# Patient Record
Sex: MALE | Race: WHITE | HISPANIC OR LATINO | Employment: FULL TIME | URBAN - METROPOLITAN AREA
[De-identification: names, ages, dates, MRNs, and addresses within clinical notes are randomized per-mention and may not be internally consistent; named-entity substitution may affect disease eponyms.]

---

## 2017-06-04 ENCOUNTER — APPOINTMENT (OUTPATIENT)
Dept: LAB | Facility: HOSPITAL | Age: 52
End: 2017-06-04
Payer: COMMERCIAL

## 2017-06-04 ENCOUNTER — TRANSCRIBE ORDERS (OUTPATIENT)
Dept: LAB | Facility: HOSPITAL | Age: 52
End: 2017-06-04

## 2017-06-04 DIAGNOSIS — Z56.6 OTHER PHYSICAL AND MENTAL STRAIN RELATED TO WORK: ICD-10-CM

## 2017-06-04 DIAGNOSIS — R73.01 IMPAIRED FASTING GLUCOSE: ICD-10-CM

## 2017-06-04 DIAGNOSIS — Z72.820 SLEEP DEPRIVATION: ICD-10-CM

## 2017-06-04 DIAGNOSIS — Z72.0 TOBACCO USE: ICD-10-CM

## 2017-06-04 LAB
ANION GAP SERPL CALCULATED.3IONS-SCNC: 5 MMOL/L (ref 4–13)
BUN SERPL-MCNC: 24 MG/DL (ref 5–25)
CALCIUM SERPL-MCNC: 8.3 MG/DL (ref 8.3–10.1)
CHLORIDE SERPL-SCNC: 106 MMOL/L (ref 100–108)
CO2 SERPL-SCNC: 29 MMOL/L (ref 21–32)
CREAT SERPL-MCNC: 0.71 MG/DL (ref 0.6–1.3)
EST. AVERAGE GLUCOSE BLD GHB EST-MCNC: 117 MG/DL
GFR SERPL CREATININE-BSD FRML MDRD: >60 ML/MIN/1.73SQ M
GLUCOSE P FAST SERPL-MCNC: 117 MG/DL (ref 65–99)
HBA1C MFR BLD: 5.7 % (ref 4.2–6.3)
POTASSIUM SERPL-SCNC: 4.4 MMOL/L (ref 3.5–5.3)
SODIUM SERPL-SCNC: 140 MMOL/L (ref 136–145)

## 2017-06-04 PROCEDURE — 83036 HEMOGLOBIN GLYCOSYLATED A1C: CPT

## 2017-06-04 PROCEDURE — 36415 COLL VENOUS BLD VENIPUNCTURE: CPT

## 2017-06-04 PROCEDURE — 80048 BASIC METABOLIC PNL TOTAL CA: CPT

## 2017-06-04 SDOH — HEALTH STABILITY - MENTAL HEALTH: OTHER PHYSICAL AND MENTAL STRAIN RELATED TO WORK: Z56.6

## 2017-06-05 ENCOUNTER — GENERIC CONVERSION - ENCOUNTER (OUTPATIENT)
Dept: OTHER | Facility: OTHER | Age: 52
End: 2017-06-05

## 2018-01-09 NOTE — MISCELLANEOUS
Message  Return to work or school:   Teresa Oviedo is under my professional care  He was seen in my office on 8/18/16       Please excuse patient but he cannot work overtime from his usual schedule due to his medical conditions, No more than 8hrs  Feel free to contact me if any questions          Future Appointments    Signatures   Electronically signed by : LAMAR Dumas ; Aug 18 2016  4:00PM EST                       (Author)    Electronically signed by : LAMAR Dumas ; Aug 18 2016  5:59PM EST                       (Author)    Electronically signed by : LAMAR Dumas ; Aug 19 2016 10:55AM EST                       (Author)

## 2018-01-11 NOTE — RESULT NOTES
Message   please notify pt his stress test was ok, did not show anything concerning from the heart  Ill follow up with him as scheduled on   tx     Verified Results  ECHO STRESS TEST W CONTRAST IF INDICATED 2016 02:51PM Daniel Parisi     Test Name Result Flag Reference   ECHO STRESS TEST W CONTRAST IF INDICATED (Report)     [address]   [facility], [address] [facility]   [phone]     Exercise Stress Echocardiography     Study date: 13-Sep-2016     Patient: Keiry Fairchild   MR number: [id number]   Account number: [de-identified]   : 1965   Age: 52 years   Gender: Male   Study date: 13-Sep-2016   Status: Outpatient   Location: [address] [place] [address] [facility] lab   Height: 73 in   Weight: 275 lb   BP: 120// 72 mmHg     Indications: Assessment of left ventricular function  Diagnosis: R07 9 - Chest pain, unspecified     Sonographer: KADE Bryson Do   Primary Physician: Meena Coronado MD   Referring Physician: Meena Coronado MD   Group: [facility]   Interpreting Physician: Aretha Guo MD     IMPRESSIONS:   Normal study after maximal exercise without reproduction of symptoms  Left   ventricular systolic function was normal  Diagnostic sensitivity was limited by   submaximal stress  Frequent PVCs were present after exercise  SUMMARY     STRESS RESULTS:   Duration of exercise was 7 min and 26 sec  Maximal work rate was 9 1 METs  Functional capacity was slightly decreased (20% to 30%)  Maximal heart rate during stress was 137 bpm ( 79 % of maximal predicted heart   rate)  Target heart rate was not achieved  There was no chest pain during stress  ECG CONCLUSIONS:   The stress ECG was negative for ischemia  BASELINE:   Estimated left ventricular ejection fraction was 55 %   ECHO CONCLUSIONS:   There was no diagnostic evidence for stress-induced ischemia       HISTORY: The patient is a 52year old male  Chest pain status: chest pain  REST ECG: Normal sinus rhythm  PROCEDURE: The study was performed in the stress lab  The study was performed   in the [address]  Treadmill exercise testing was   performed, using the Adwoa protocol  Stress and rest echocardiographic   evaluation was performed from multiple acoustic windows for evaluation of   ventricular function  ADWOA PROTOCOL:   HR bpm SBP mmHg DBP mmHg Symptoms ST change Rhythm/conduct   Baseline 90 120 72 none none NSR, occasional PVC's   Stage 1 113 122 70 none none sinus tach, occasional PVC's   Stage 2 125 134 68 none none sinus tach, occasional PVC's   Stage 3 137 -- -- fatigue none sinus tach, occasional PVC's   Recovery 1 85 118 72 none none NSR, frequent PVC's     MEDICATIONS GIVEN: No medications or fluids given  STRESS RESULTS: Duration of exercise was 7 min and 26 sec  The patient   exercised to protocol stage 3  Maximal work rate was 9 1 METs  Functional   capacity was slightly decreased (20% to 30%)  Maximal heart rate during stress   was 137 bpm ( 79 % of maximal predicted heart rate)  Target heart rate was not   achieved  The heart rate response to stress was blunted  Maximal systolic blood   pressure during stress was 134 mmHg  There was normal resting blood pressure   with an appropriate response to stress  The rate-pressure product for the peak   heart rate and blood pressure was 66583  There was no chest pain during stress  The stress test was terminated due to arrhythmia  ECG CONCLUSIONS: The stress ECG was negative for ischemia  Arrhythmia during   stress: isolated premature ventricular beats  STRESS 2D ECHO RESULTS:     BASELINE: Left ventricular size was normal  Overall left ventricular systolic   function was normal  Estimated left ventricular ejection fraction was 55 %   PEAK STRESS: There was no evidence for new left ventricular regional wall   motion abnormalities  There was an appropriate reduction in left ventricular   size  There was an appropriate augmentation in LV function  ECHO CONCLUSIONS: There was no diagnostic evidence for stress-induced ischemia  The image quality was good       Prepared and electronically signed by     Morgan Khan MD   Signed [id number] 16:58:59       Signatures   Electronically signed by : Ignacio Nissen, M D ; Sep 14 2016 12:47PM EST                       (Author)

## 2018-01-15 NOTE — MISCELLANEOUS
Message  Return to work or school:   Jeffrey Joshua is under my professional care  He was seen in my office on 11/2/16       Patient has been doing well and stable  He is able to continue regular work duties without restrictions          Future Appointments    Signatures   Electronically signed by : LAMAR Flanagan ; Nov 2 2016  4:11PM EST                       (Author)    Electronically signed by : LAMAR Flanagan ; Nov 2 2016  4:59PM EST                       (Author)    Electronically signed by : LAMAR Flanagan ; Nov 2 2016  5:09PM EST                       (Author)

## 2018-01-15 NOTE — MISCELLANEOUS
Message  Return to work or school:   Char Ghosh is under my professional care  He was seen in my office on 08/18/2016       Please excuse patient he cannot work overtime from his usual schedule due to his medical conditions, no more than 8hrs a day and unlimited          Signatures   Electronically signed by : Yariel Abbott, ; Aug 22 2016  4:56PM EST                       (/Recorder)

## 2018-01-18 NOTE — RESULT NOTES
Verified Results  (1) HEMOGLOBIN A1C [id number] 08:29AM [facility] Order Number: [id number]     Test Name Result Flag Reference   HEMOGLOBIN A1C 5 7 %  4 2-6 3   EST  AVG  GLUCOSE 117 mg/dl       (1) BASIC METABOLIC PROFILE [id number] 08:29AM [facility] Order Number: [id number]     Test Name Result Flag Reference   SODIUM 140 mmol/L  136-145   POTASSIUM 4 4 mmol/L  3 5-5 3   CHLORIDE 106 mmol/L  100-108   CARBON DIOXIDE 29 mmol/L  21-32   ANION GAP (CALC) 5 mmol/L  4-13   BLOOD UREA NITROGEN 24 mg/dL  5-25   CREATININE 0 71 mg/dL  0 60-1 30   Standardized to IDMS reference method   CALCIUM 8 3 mg/dL  8 3-10 1   eGFR Non-African American      >60 0 ml/min/1 73sq [facility] Disease Education Program recommendations are as follows:  GFR calculation is accurate only with a steady state creatinine  Chronic Kidney disease less than 60 ml/min/1 73 sq  meters  Kidney failure less than 15 ml/min/1 73 sq  meters     GLUCOSE FASTING 117 mg/dL H 65-99       Signatures   Electronically signed by : LAMAR Zavala ; Jun 5 2017 12:32PM EST                       (Author)

## 2018-01-24 NOTE — PROGRESS NOTES
Assessment   1  Stress at work (V62 1) (Z56 6)    Plan  Influenza vaccine needed    · Temporarily Stop: Fluzone Quadrivalent 0 5 ML Intramuscular Suspension    Discussion/Summary    Educated pt that he needs to take care of himself and rest is very important  He should sleep at least 6hr or more  A work-life balance is necessary, stress reduction and exercise are also important for a healthy lifestyle  WIll relief him to full work duty but he is aware of recommendations  1    Possible side effects of new medications were reviewed with the patient/guardian today1  The treatment plan was reviewed with the patient/guardian  The patient/guardian understands and agrees with the treatment plan1    The  patient1  was counseled regarding1  instructions for management1 , risk factor reductions1 , impressions1   Total time of encounter was 20 minutes1         1 Amended By: Tru Hoff; Nov 02 2016 5:09 PM EST    Chief Complaint  pt here for a follow up visit  pt wants to have restricted lifted allowing him to work overtime  pt continues to work 2 jobs  pt declined flu vaccine today      History of Present Illness  Pt here for clearance to return to work without restrictions  Pt seen previously after ER visit for chest pain, had work up done, including normal stress test  He states he has been feeling better  At the time he was working 2 jobs ne of them overtime, so restriction were made for him to return but explained he needed to rest as he was only sleeping 2hrs  Now sleeping 6hs  no more CP 1        1 Amended By: Tru Hoff; Nov 02 2016 4:56 PM EST    Active Problems   1  Chest pain, atypical (786 59) (R07 89)  2  Fever of unknown origin (780 60) (R50 9)  3  Impaired fasting glucose (790 21) (R73 01)  4  Lack of adequate sleep (V69 4) (Z72 820)  5  Stress at work (V62 1) (Z56 6)  6  Tobacco use (305 1) (Z72 0)    Past Medical History   1  History of Difficulty breathing (786 09) (R06 89)  2   History of chest pain (V13 89) (Z87 898)  3  Urinary tract infection (599 0) (N39 0)    The active problems and past medical history were reviewed and updated today  1        1 Amended By: Cali Luna; Nov 02 2016 4:59 PM EST    Surgical History   1  History of Prior Surgical Procedure Not Done    Family History  Mother   1  Family history of Alzheimer Disease  2  Family history of Diabetes Mellitus (V18 0)    Social History    · Caffeine Use   · Former smoker (V15 82) (T93 353)   · Lack of adequate sleep (V69 4) (Z72 820)   · Lives with spouse   · Never Drank Alcohol   · Never uses seat belt   · Occupation   · Stress at work (V62 1) (Z56 6)   · Tobacco use (305 1) (Z72 0)    Current Meds  1  No Reported Medications Recorded    The medication list was reviewed and updated today  1        1 Amended By: Cali Luna; Nov 02 2016 4:58 PM EST    Allergies   1  No Known Drug Allergies    Vitals  Vital Signs    Recorded: 12LWP0747 43:09FS   Systolic 970   Diastolic 70   Heart Rate 95   Temperature 97 8 F   O2 Saturation 98   Height 5 ft 11 5 in   Weight 221 lb 2 08 oz   BMI Calculated 30 41   BSA Calculated 2 21     Physical Exam    Constitutional1    General appearance: No acute distress, well appearing and well nourished  1    Pulmonary1    Respiratory effort: No increased work of breathing or signs of respiratory distress  1    Auscultation of lungs: Clear to auscultation, equal breath sounds bilaterally, no wheezes, no rales, no rhonci  1    Cardiovascular1    Auscultation of heart: Normal rate and rhythm, normal S1 and S2, without murmurs  1    Examination of extremities for edema and/or varicosities: Normal 1    Psychiatric1    Mood and affect: Normal 1          1 Amended By: Cali Luna; Nov 02 2016 4:59 PM EST    Message    Gonzales Nye is under my professional care  He was seen in my office on 11/2/16       Patient has been doing well and stable   He is able to continue regular work duties without restrictions  Future Appointments    Date/Time Provider Specialty Site   05/18/2017 04:30 PM Deloris Dandy, M D [address]     Signatures   Electronically signed by : LAMAR Morales ; Nov 2 2016  4:59PM EST                       (Author)    Electronically signed by : LAMAR Morales ; Nov 2 2016  5:09PM EST                       (Author)

## 2018-02-26 ENCOUNTER — OFFICE VISIT (OUTPATIENT)
Dept: FAMILY MEDICINE CLINIC | Facility: CLINIC | Age: 53
End: 2018-02-26
Payer: COMMERCIAL

## 2018-02-26 VITALS
RESPIRATION RATE: 16 BRPM | TEMPERATURE: 98.3 F | WEIGHT: 223.6 LBS | BODY MASS INDEX: 30.28 KG/M2 | DIASTOLIC BLOOD PRESSURE: 80 MMHG | HEART RATE: 78 BPM | HEIGHT: 72 IN | OXYGEN SATURATION: 98 % | SYSTOLIC BLOOD PRESSURE: 110 MMHG

## 2018-02-26 DIAGNOSIS — E66.9 OBESITY (BMI 30-39.9): ICD-10-CM

## 2018-02-26 DIAGNOSIS — Z12.11 ENCOUNTER FOR SCREENING COLONOSCOPY: ICD-10-CM

## 2018-02-26 DIAGNOSIS — R73.01 IMPAIRED FASTING GLUCOSE: Primary | ICD-10-CM

## 2018-02-26 DIAGNOSIS — Z91.89 ENCOUNTER FOR HEPATITIS C VIRUS SCREENING TEST FOR HIGH RISK PATIENT: ICD-10-CM

## 2018-02-26 DIAGNOSIS — Z00.00 HEALTHCARE MAINTENANCE: ICD-10-CM

## 2018-02-26 DIAGNOSIS — Z12.5 SCREENING PSA (PROSTATE SPECIFIC ANTIGEN): ICD-10-CM

## 2018-02-26 DIAGNOSIS — Z11.59 ENCOUNTER FOR HEPATITIS C VIRUS SCREENING TEST FOR HIGH RISK PATIENT: ICD-10-CM

## 2018-02-26 PROCEDURE — 99396 PREV VISIT EST AGE 40-64: CPT | Performed by: FAMILY MEDICINE

## 2018-02-26 NOTE — PROGRESS NOTES
Assessment/Plan:    No problem-specific Assessment & Plan notes found for this encounter  Diagnoses and all orders for this visit:    Impaired fasting glucose  -     Lipid panel; Future  -     Comprehensive metabolic panel; Future  -     CBC and differential; Future  -     Hemoglobin A1c; Future    Healthcare maintenance  -     Ambulatory referral to Gastroenterology; Future  -     Lipid panel; Future  -     Comprehensive metabolic panel; Future  -     CBC and differential; Future  -     Hemoglobin A1c; Future    Obesity (BMI 30-39 9)  -     Ambulatory referral to Gastroenterology; Future  -     Lipid panel; Future  -     Comprehensive metabolic panel; Future  -     CBC and differential; Future  -     Hemoglobin A1c; Future    Encounter for screening colonoscopy  -     Ambulatory referral to Gastroenterology; Future  -     Lipid panel; Future  -     Comprehensive metabolic panel; Future  -     CBC and differential; Future  -     Hemoglobin A1c; Future    Screening PSA (prostate specific antigen)  -     PSA Total (Reflex To Free); Future    Encounter for hepatitis C virus screening test for high risk patient  -     Hepatitis C antibody; Future        Get labs, will call with results    Subjective: Patient is here for a physical exam   Patient has no issues today    Patient refused the flu vac today  Patient ID: Ivis Irene is a 50 y o  male  HPI   Patient presents today for a physical   Patient last seen November 2016    The following portions of the patient's history were reviewed and updated as appropriate: allergies, current medications, past family history, past medical history, past social history, past surgical history and problem list     Review of Systems   Constitutional: Negative for activity change and appetite change  HENT: Negative  Eyes: Negative  Respiratory: Negative  Cardiovascular: Negative  Gastrointestinal: Negative  Genitourinary: Negative  Musculoskeletal: Negative for arthralgias  Skin: Negative for rash  Neurological: Negative  Psychiatric/Behavioral: Negative  Objective:       /80   Pulse 78   Temp 98 3 °F (36 8 °C)   Resp 16   Ht 5' 11 65" (1 82 m)   Wt 101 kg (223 lb 9 6 oz)   SpO2 98%   BMI 30 62 kg/m²          Physical Exam   Constitutional: He is oriented to person, place, and time  He appears well-developed and well-nourished  HENT:   Head: Normocephalic  Eyes: Conjunctivae are normal    Cardiovascular: Normal rate, regular rhythm and normal heart sounds  Pulmonary/Chest: Effort normal and breath sounds normal  No respiratory distress  He has no wheezes  He has no rales  Abdominal: Soft  Bowel sounds are normal  He exhibits no distension  There is no tenderness  Neurological: He is alert and oriented to person, place, and time  Psychiatric: He has a normal mood and affect  His behavior is normal    Vitals reviewed

## 2018-03-04 ENCOUNTER — APPOINTMENT (OUTPATIENT)
Dept: LAB | Facility: HOSPITAL | Age: 53
End: 2018-03-04
Payer: COMMERCIAL

## 2018-03-04 DIAGNOSIS — R73.01 IMPAIRED FASTING GLUCOSE: ICD-10-CM

## 2018-03-04 DIAGNOSIS — Z11.59 ENCOUNTER FOR HEPATITIS C VIRUS SCREENING TEST FOR HIGH RISK PATIENT: ICD-10-CM

## 2018-03-04 DIAGNOSIS — Z91.89 ENCOUNTER FOR HEPATITIS C VIRUS SCREENING TEST FOR HIGH RISK PATIENT: ICD-10-CM

## 2018-03-04 DIAGNOSIS — Z12.11 ENCOUNTER FOR SCREENING COLONOSCOPY: ICD-10-CM

## 2018-03-04 DIAGNOSIS — E66.9 OBESITY (BMI 30-39.9): ICD-10-CM

## 2018-03-04 DIAGNOSIS — Z00.00 HEALTHCARE MAINTENANCE: ICD-10-CM

## 2018-03-04 DIAGNOSIS — Z12.5 SCREENING PSA (PROSTATE SPECIFIC ANTIGEN): ICD-10-CM

## 2018-03-04 LAB
ALBUMIN SERPL BCP-MCNC: 3.9 G/DL (ref 3.5–5)
ALP SERPL-CCNC: 93 U/L (ref 46–116)
ALT SERPL W P-5'-P-CCNC: 42 U/L (ref 12–78)
ANION GAP SERPL CALCULATED.3IONS-SCNC: 4 MMOL/L (ref 4–13)
AST SERPL W P-5'-P-CCNC: 25 U/L (ref 5–45)
BASOPHILS # BLD AUTO: 0 THOUSANDS/ΜL (ref 0–0.1)
BASOPHILS NFR BLD AUTO: 0 % (ref 0–1)
BILIRUB SERPL-MCNC: 0.61 MG/DL (ref 0.2–1)
BUN SERPL-MCNC: 24 MG/DL (ref 5–25)
CALCIUM SERPL-MCNC: 8.2 MG/DL (ref 8.3–10.1)
CHLORIDE SERPL-SCNC: 107 MMOL/L (ref 100–108)
CHOLEST SERPL-MCNC: 147 MG/DL (ref 50–200)
CO2 SERPL-SCNC: 28 MMOL/L (ref 21–32)
CREAT SERPL-MCNC: 0.73 MG/DL (ref 0.6–1.3)
EOSINOPHIL # BLD AUTO: 0.14 THOUSAND/ΜL (ref 0–0.61)
EOSINOPHIL NFR BLD AUTO: 4 % (ref 0–6)
ERYTHROCYTE [DISTWIDTH] IN BLOOD BY AUTOMATED COUNT: 12.6 % (ref 11.6–15.1)
EST. AVERAGE GLUCOSE BLD GHB EST-MCNC: 120 MG/DL
GFR SERPL CREATININE-BSD FRML MDRD: 108 ML/MIN/1.73SQ M
GLUCOSE P FAST SERPL-MCNC: 115 MG/DL (ref 65–99)
HBA1C MFR BLD: 5.8 % (ref 4.2–6.3)
HCT VFR BLD AUTO: 39.2 % (ref 36.5–49.3)
HCV AB SER QL: NORMAL
HDLC SERPL-MCNC: 38 MG/DL (ref 40–60)
HGB BLD-MCNC: 13.5 G/DL (ref 12–17)
LDLC SERPL CALC-MCNC: 88 MG/DL (ref 0–100)
LYMPHOCYTES # BLD AUTO: 1.42 THOUSANDS/ΜL (ref 0.6–4.47)
LYMPHOCYTES NFR BLD AUTO: 45 % (ref 14–44)
MCH RBC QN AUTO: 30.2 PG (ref 26.8–34.3)
MCHC RBC AUTO-ENTMCNC: 34.4 G/DL (ref 31.4–37.4)
MCV RBC AUTO: 88 FL (ref 82–98)
MONOCYTES # BLD AUTO: 0.25 THOUSAND/ΜL (ref 0.17–1.22)
MONOCYTES NFR BLD AUTO: 8 % (ref 4–12)
NEUTROPHILS # BLD AUTO: 1.35 THOUSANDS/ΜL (ref 1.85–7.62)
NEUTS SEG NFR BLD AUTO: 43 % (ref 43–75)
NRBC BLD AUTO-RTO: 0 /100 WBCS
PLATELET # BLD AUTO: 183 THOUSANDS/UL (ref 149–390)
PMV BLD AUTO: 12.3 FL (ref 8.9–12.7)
POTASSIUM SERPL-SCNC: 4.1 MMOL/L (ref 3.5–5.3)
PROT SERPL-MCNC: 7.5 G/DL (ref 6.4–8.2)
RBC # BLD AUTO: 4.47 MILLION/UL (ref 3.88–5.62)
SODIUM SERPL-SCNC: 139 MMOL/L (ref 136–145)
TRIGL SERPL-MCNC: 107 MG/DL
WBC # BLD AUTO: 3.16 THOUSAND/UL (ref 4.31–10.16)

## 2018-03-04 PROCEDURE — 80053 COMPREHEN METABOLIC PANEL: CPT

## 2018-03-04 PROCEDURE — 84153 ASSAY OF PSA TOTAL: CPT

## 2018-03-04 PROCEDURE — 36415 COLL VENOUS BLD VENIPUNCTURE: CPT

## 2018-03-04 PROCEDURE — 86803 HEPATITIS C AB TEST: CPT

## 2018-03-04 PROCEDURE — 80061 LIPID PANEL: CPT

## 2018-03-04 PROCEDURE — 83036 HEMOGLOBIN GLYCOSYLATED A1C: CPT

## 2018-03-04 PROCEDURE — 85025 COMPLETE CBC W/AUTO DIFF WBC: CPT

## 2018-03-07 LAB — MISCELLANEOUS LAB TEST RESULT: NORMAL

## 2019-03-11 NOTE — PROGRESS NOTES
Assessment/Plan:    Prediabetes  Discussed diet changes to work on improving glycemic control, such as eating more vegetables  Also discussed weight loss and increasing exercise  Fasting labwork ordered to assess current status  Obesity (BMI 30-39  9)  Discussed weight loss with the patient  Recommend eating more vegetables and not eating candy at night  Encouraged patient to use the gym at work to exercise regularly  Discussed that patient would need to work up to exercising 300 minutes per week to achieve weight loss  Skin lesion of left ear  Will refer patient to dermatology for evaluation and management of the skin lesion in the ear and the sebaceous cyst on the chest wall  Diagnoses and all orders for this visit:    Prediabetes  -     HEMOGLOBIN A1C W/ EAG ESTIMATION; Future    Screening for colon cancer  -     Occult blood 1-3, stool; Future    Screening, lipid  -     Lipid Panel with Direct LDL reflex; Future    Annual physical exam  -     CBC and differential; Future  -     TSH, 3rd generation with Free T4 reflex; Future  -     Comprehensive metabolic panel; Future    Screening for prostate cancer  -     PSA Total (Reflex To Free); Future    Skin lesion of left ear  -     Ambulatory referral to Dermatology; Future    Obesity (BMI 30 0-34  9)    Sebaceous cyst    Other orders  -     Multiple Vitamin (MULTIVITAMIN) tablet; Take 1 tablet by mouth daily          Subjective: Patient is here for a physical exam   Patient refused the flu vaccine   Patient given the phq 9   Patient is aware he needs a colonoscopy     Health Maintenance Due   Topic Date Due    Depression Screening PHQ  1965    CRC Screening: Colonoscopy  1965    BMI: Followup Plan  09/30/1985    BMI: Adult  09/30/1985          Patient ID: Edie Zhong is a 48 y o  male  Patient presents to clinic today for an annual physical exam   Patient has a history of prediabetes    Upon review of his diet, he will eat lettuce and tomatoes, but no other vegetables  He does like to eat fruits  Patient eats candy before bed every night  He has free access to a gym at work, but he does not use it  He did not have his colonoscopy done as ordered at last year's visit  He has no complaints today  The following portions of the patient's history were reviewed and updated as appropriate: allergies, current medications, past family history, past medical history, past social history, past surgical history and problem list     Review of Systems   Constitutional: Negative  HENT: Negative  Eyes: Negative  Respiratory: Negative  Cardiovascular: Negative  Gastrointestinal: Negative  Genitourinary: Negative  Musculoskeletal: Negative  Skin: Negative  Neurological: Negative  Hematological: Negative  Psychiatric/Behavioral: Negative  Objective:      /70 (BP Location: Left arm, Patient Position: Sitting, Cuff Size: Adult)   Pulse 82   Temp 98 2 °F (36 8 °C) (Oral)   Resp 18   Ht 5' 10 87" (1 8 m)   Wt 104 kg (229 lb)   SpO2 98%   BMI 32 06 kg/m²          Physical Exam   Constitutional: He is oriented to person, place, and time  Vital signs are normal  He appears well-developed and well-nourished  He is cooperative  HENT:   Head: Normocephalic and atraumatic  Right Ear: Hearing, tympanic membrane, external ear and ear canal normal    Left Ear: Hearing, tympanic membrane, external ear and ear canal normal    Nose: Nose normal    Mouth/Throat: Uvula is midline, oropharynx is clear and moist and mucous membranes are normal    Eyes: Pupils are equal, round, and reactive to light  Conjunctivae and lids are normal    Neck: Normal range of motion  Neck supple  Carotid bruit is not present  No thyromegaly present  Cardiovascular: Normal rate, regular rhythm and normal heart sounds  Pulmonary/Chest: Effort normal and breath sounds normal    Abdominal: Soft   Normal appearance and bowel sounds are normal  There is no tenderness  Musculoskeletal: Normal range of motion  Lymphadenopathy:     He has no cervical adenopathy  Neurological: He is alert and oriented to person, place, and time  He has normal strength  No cranial nerve deficit  Reflex Scores:       Patellar reflexes are 2+ on the right side and 2+ on the left side  Skin: Skin is warm and dry  Lesion (left ear--flat and discolored ) noted  Psychiatric: He has a normal mood and affect  His speech is normal and behavior is normal  Thought content normal    Nursing note and vitals reviewed  BMI Counseling: Body mass index is 32 06 kg/m²  Discussed the patient's BMI with him  The BMI is above average  BMI counseling and education was provided to the patient  Nutrition recommendations include 3-5 servings of fruits/vegetables daily and moderation in carbohydrate intake  Exercise recommendations include moderate aerobic physical activity for 150 minutes/week

## 2019-03-12 ENCOUNTER — OFFICE VISIT (OUTPATIENT)
Dept: FAMILY MEDICINE CLINIC | Facility: CLINIC | Age: 54
End: 2019-03-12
Payer: COMMERCIAL

## 2019-03-12 VITALS
SYSTOLIC BLOOD PRESSURE: 120 MMHG | BODY MASS INDEX: 32.06 KG/M2 | RESPIRATION RATE: 18 BRPM | HEART RATE: 82 BPM | DIASTOLIC BLOOD PRESSURE: 70 MMHG | TEMPERATURE: 98.2 F | WEIGHT: 229 LBS | HEIGHT: 71 IN | OXYGEN SATURATION: 98 %

## 2019-03-12 DIAGNOSIS — R73.03 PREDIABETES: Primary | ICD-10-CM

## 2019-03-12 DIAGNOSIS — H61.92 SKIN LESION OF LEFT EAR: ICD-10-CM

## 2019-03-12 DIAGNOSIS — Z12.11 SCREENING FOR COLON CANCER: ICD-10-CM

## 2019-03-12 DIAGNOSIS — L72.3 SEBACEOUS CYST: ICD-10-CM

## 2019-03-12 DIAGNOSIS — E66.9 OBESITY (BMI 30.0-34.9): ICD-10-CM

## 2019-03-12 DIAGNOSIS — Z00.00 ANNUAL PHYSICAL EXAM: ICD-10-CM

## 2019-03-12 DIAGNOSIS — Z13.220 SCREENING, LIPID: ICD-10-CM

## 2019-03-12 DIAGNOSIS — Z12.5 SCREENING FOR PROSTATE CANCER: ICD-10-CM

## 2019-03-12 PROCEDURE — 1036F TOBACCO NON-USER: CPT | Performed by: FAMILY MEDICINE

## 2019-03-12 PROCEDURE — 99214 OFFICE O/P EST MOD 30 MIN: CPT | Performed by: FAMILY MEDICINE

## 2019-03-12 PROCEDURE — 3008F BODY MASS INDEX DOCD: CPT | Performed by: FAMILY MEDICINE

## 2019-03-12 RX ORDER — MULTIVITAMIN
1 TABLET ORAL DAILY
COMMUNITY

## 2019-03-12 NOTE — PATIENT INSTRUCTIONS
Hiperglicemia no diabética   LO QUE NECESITA SABER:   La hiperglicemia es un nivel más elevado que el normal de azúcar (glucosa) en la monique  La hiperglicemia puede presentarse por un periodo corto, o puede volverse nhan condición a ruth ann plazo que conlleva a diabetes  INSTRUCCIONES SOBRE [facility]:   Medicamentos:  Es posible que usted necesite alguno de los siguientes:  · Los medicamentos hipoglicémicos  ayuda a disminuir la cantidad de azúcar en salazar monique  [place] medicamento ayuda a salazar cuerpo a trasportar la glucosa a las células que las necesitan para crear energía  Salazar médico le indicara con qué frecuencia y por cuánto tiempo tiene que samson Vivian  · Insulina  ayuda a disminuir los niveles de azúcar en la monique  Usted podría necesitar 1 o más inyecciones de insulina al día  A usted o a un familiar le enseñarán cómo administrar las inyecciones de insulina  Salazar médico le indicará con qué frecuencia debe inyectarse la insulina cada día  También le indicará por cuánto tiempo necesita tomársela  · [place] andreas medicamentos elsa se le haya indicado  Consulte con salazar médico si usted charles que salazar medicamento no le está ayudando o si presenta efectos secundarios  Infórmele si es alérgico a algún medicamento  Mantenga nhan lista actualizada de los [place], las vitaminas y los productos herbales que john  Incluya los siguientes datos de los medicamentos: cantidad, frecuencia y motivo de administración  Traiga con usted la lista o los envases de la píldoras a andreas citas de seguimiento  Lleve la lista de los medicamentos con usted en erwin de nhan emergencia  Acuda a andreas consultas de control con salazar médico según le indicaron  Es posible que necesite regresar para que le realicen más exámenes  Salazar médico podría también referirlo a un especialista, elsa un dietista  Anote andreas preguntas para que se acuerde de hacerlas yane andreas visitas     Maneje salazar hiperglucemia:  Lo siguiente puede ayudarle a mantener salazar azúcar en monique en los niveles recomendados por salazar médico:  · Ejercítese regularmente  [place] puede ayudar a disminuir salazar nivel de azúcar en la monique  También puede mejorar la tammi de salazar corazón y le va ayudar a tener un peso ideal para usted  Por lo menos tasha 30 minutos de [place] física 5 días cada semana  Pregunte a salazar médico acerca del mejor plan de ejercicio para usted  · Baje de peso si tiene sobrepeso  Incluso nhan mínima disminución del 5% al 10% de salazar peso corporal puede ayudar a reducir el nivel de azúcar en la monique  La perdida de peso también puede mejorar la tammi de salazar corazón  · Consuma alimentos saludables  Incluya alimentos que con elevado contenido de Ann, elsa verduras, frutas, granos integrales y legumbres  También incluya alimentos bajos en grasa, elsa productos lácteos bajos en grasa ([place], yogurt y [place]), pescado y Dilia Douse  Limite los alimentos que son altos en calorías y azúcar, elsa postres, dulces, papitas saladas y caramelos  Limite los alimentos que son altos en sodio, elsa la sal de lara y alimentos salados  Salazar médico le puede recomendar que limite el consumo de carbohidratos para bajar andreas niveles de azúcar en la monique  · No fume  Si usted fuma, nunca es demasiado tarde para dejar de hacerlo  El tabaquismo puede [employer] problemas que pueden ocurrir con la hiperglicemia  Solicite información a salazar médico si usted necesita ayuda para dejar de fumar  · Limite el consumo de alcohol  Las mujeres deberían limitar el consumo de alcohol a 1 bebida por día  Los hombres deberían limitar el consumo de alcohol a 2 tragos al día  Un trago equivale a 12 onzas de cerveza, 5 onzas de vino o 1 onza y ½ de licor  [address] revisar salazar nivel de azúcar en la monique:  Es posible que necesite revisar salazar nivel de azúcar o glucosa en la monique con un glucómetro   Si usted john insulina, usted puede que necesite revisar salazar nivel de azúcar en la monique por lo menos 3 veces al día  Pregunte a hernandez médico cuándo y con qué frecuencia revisarlo yane el día  Pregunte cuál debería ser hernandez nivel de azúcar antes y después de comer  Es posible que deba controlar si tiene presencia de cetonas en hernandez orina o hernandez monique en erwin de que hernandez nivel de azúcar en la monique sea alto  Anote andreas resultados y muéstreselos a hernandez Tiffanie Pea a hernandez [place] vitaminas y minerales son adecuados para usted  · Usted tiene fiebre  · Hernandez nivel de azúcar en la monique continua estando más alto que lo que le indicaron que debería estar  · Usted sigue orinando con más frecuencia que lo acostumbrado  · Usted continua teniendo más sed que lo usual      · Usted continua teniendo náuseas y vómitos  · Usted tiene nhan herida que tiene signos de infección, elsa enrojecimiento, inflamación y pus  · Usted tiene preguntas o inquietudes acerca de hernandez condición o cuidado  Regrese a la juana de emergencias si:   · Hernandez brazo o pierna se siente caliente, sensible y adolorida  Se podría jose inflamado y askew  · Usted se siente mareada, le hace falta el aire y tiene dolor de [place]  · Usted expectora monique  © 2017 [address] [employer] Information is for End User's use only and may not be sold, redistributed or otherwise used for commercial purposes  All illustrations and images included in CareNotes® are the copyrighted property of A D A M , Inc  or Carlos Escudero  Esta información es sólo para uso en educación  Hernandez intención no es darle un consejo médico sobre enfermedades o tratamientos  Colsulte con hernandez Theresa Soler farmacéutico antes de seguir cualquier régimen médico para saber si es seguro y efectivo para usted

## 2019-03-12 NOTE — ASSESSMENT & PLAN NOTE
Discussed diet changes to work on improving glycemic control, such as eating more vegetables  Also discussed weight loss and increasing exercise  Fasting labwork ordered to assess current status

## 2019-03-12 NOTE — ASSESSMENT & PLAN NOTE
Discussed weight loss with the patient  Recommend eating more vegetables and not eating candy at night  Encouraged patient to use the gym at work to exercise regularly  Discussed that patient would need to work up to exercising 300 minutes per week to achieve weight loss

## 2019-03-12 NOTE — ASSESSMENT & PLAN NOTE
Will refer patient to dermatology for evaluation and management of the skin lesion in the ear and the sebaceous cyst on the chest wall

## 2019-03-23 ENCOUNTER — APPOINTMENT (OUTPATIENT)
Dept: LAB | Facility: HOSPITAL | Age: 54
End: 2019-03-23
Payer: COMMERCIAL

## 2019-03-23 ENCOUNTER — TRANSCRIBE ORDERS (OUTPATIENT)
Dept: ADMINISTRATIVE | Facility: HOSPITAL | Age: 54
End: 2019-03-23

## 2019-03-23 DIAGNOSIS — R73.03 PREDIABETES: ICD-10-CM

## 2019-03-23 DIAGNOSIS — Z12.5 SCREENING FOR PROSTATE CANCER: ICD-10-CM

## 2019-03-23 DIAGNOSIS — Z13.220 SCREENING, LIPID: ICD-10-CM

## 2019-03-23 DIAGNOSIS — Z00.00 ANNUAL PHYSICAL EXAM: ICD-10-CM

## 2019-03-23 LAB
ALBUMIN SERPL BCP-MCNC: 3.9 G/DL (ref 3.5–5)
ALP SERPL-CCNC: 103 U/L (ref 46–116)
ALT SERPL W P-5'-P-CCNC: 83 U/L (ref 12–78)
ANION GAP SERPL CALCULATED.3IONS-SCNC: 7 MMOL/L (ref 4–13)
AST SERPL W P-5'-P-CCNC: 37 U/L (ref 5–45)
BASOPHILS # BLD AUTO: 0.02 THOUSANDS/ΜL (ref 0–0.1)
BASOPHILS NFR BLD AUTO: 1 % (ref 0–1)
BILIRUB SERPL-MCNC: 0.6 MG/DL (ref 0.2–1)
BUN SERPL-MCNC: 22 MG/DL (ref 5–25)
CALCIUM SERPL-MCNC: 8.7 MG/DL (ref 8.3–10.1)
CHLORIDE SERPL-SCNC: 105 MMOL/L (ref 100–108)
CHOLEST SERPL-MCNC: 147 MG/DL (ref 50–200)
CO2 SERPL-SCNC: 27 MMOL/L (ref 21–32)
CREAT SERPL-MCNC: 0.81 MG/DL (ref 0.6–1.3)
EOSINOPHIL # BLD AUTO: 0.17 THOUSAND/ΜL (ref 0–0.61)
EOSINOPHIL NFR BLD AUTO: 4 % (ref 0–6)
ERYTHROCYTE [DISTWIDTH] IN BLOOD BY AUTOMATED COUNT: 12 % (ref 11.6–15.1)
EST. AVERAGE GLUCOSE BLD GHB EST-MCNC: 140 MG/DL
GFR SERPL CREATININE-BSD FRML MDRD: 103 ML/MIN/1.73SQ M
GLUCOSE P FAST SERPL-MCNC: 147 MG/DL (ref 65–99)
HBA1C MFR BLD: 6.5 % (ref 4.2–6.3)
HCT VFR BLD AUTO: 42.7 % (ref 36.5–49.3)
HDLC SERPL-MCNC: 30 MG/DL (ref 40–60)
HGB BLD-MCNC: 14.4 G/DL (ref 12–17)
IMM GRANULOCYTES # BLD AUTO: 0.01 THOUSAND/UL (ref 0–0.2)
IMM GRANULOCYTES NFR BLD AUTO: 0 % (ref 0–2)
LDLC SERPL CALC-MCNC: 80 MG/DL (ref 0–100)
LYMPHOCYTES # BLD AUTO: 1.6 THOUSANDS/ΜL (ref 0.6–4.47)
LYMPHOCYTES NFR BLD AUTO: 41 % (ref 14–44)
MCH RBC QN AUTO: 30.9 PG (ref 26.8–34.3)
MCHC RBC AUTO-ENTMCNC: 33.7 G/DL (ref 31.4–37.4)
MCV RBC AUTO: 92 FL (ref 82–98)
MONOCYTES # BLD AUTO: 0.36 THOUSAND/ΜL (ref 0.17–1.22)
MONOCYTES NFR BLD AUTO: 9 % (ref 4–12)
NEUTROPHILS # BLD AUTO: 1.74 THOUSANDS/ΜL (ref 1.85–7.62)
NEUTS SEG NFR BLD AUTO: 45 % (ref 43–75)
NRBC BLD AUTO-RTO: 0 /100 WBCS
PLATELET # BLD AUTO: 212 THOUSANDS/UL (ref 149–390)
PMV BLD AUTO: 12 FL (ref 8.9–12.7)
POTASSIUM SERPL-SCNC: 4.5 MMOL/L (ref 3.5–5.3)
PROT SERPL-MCNC: 7.4 G/DL (ref 6.4–8.2)
PSA SERPL-MCNC: 0.3 NG/ML (ref 0–4)
RBC # BLD AUTO: 4.66 MILLION/UL (ref 3.88–5.62)
SODIUM SERPL-SCNC: 139 MMOL/L (ref 136–145)
TRIGL SERPL-MCNC: 185 MG/DL
TSH SERPL DL<=0.05 MIU/L-ACNC: 1.34 UIU/ML (ref 0.36–3.74)
WBC # BLD AUTO: 3.9 THOUSAND/UL (ref 4.31–10.16)

## 2019-03-23 PROCEDURE — 80061 LIPID PANEL: CPT

## 2019-03-23 PROCEDURE — 36415 COLL VENOUS BLD VENIPUNCTURE: CPT

## 2019-03-23 PROCEDURE — 85025 COMPLETE CBC W/AUTO DIFF WBC: CPT

## 2019-03-23 PROCEDURE — G0103 PSA SCREENING: HCPCS

## 2019-03-23 PROCEDURE — 84443 ASSAY THYROID STIM HORMONE: CPT

## 2019-03-23 PROCEDURE — 83036 HEMOGLOBIN GLYCOSYLATED A1C: CPT

## 2019-03-23 PROCEDURE — 80053 COMPREHEN METABOLIC PANEL: CPT

## 2019-03-28 ENCOUNTER — TELEPHONE (OUTPATIENT)
Dept: FAMILY MEDICINE CLINIC | Facility: CLINIC | Age: 54
End: 2019-03-28

## 2019-03-29 ENCOUNTER — TELEPHONE (OUTPATIENT)
Dept: FAMILY MEDICINE CLINIC | Facility: CLINIC | Age: 54
End: 2019-03-29

## 2019-04-08 ENCOUNTER — OFFICE VISIT (OUTPATIENT)
Dept: FAMILY MEDICINE CLINIC | Facility: CLINIC | Age: 54
End: 2019-04-08
Payer: COMMERCIAL

## 2019-04-08 VITALS
TEMPERATURE: 99.2 F | WEIGHT: 226 LBS | BODY MASS INDEX: 31.64 KG/M2 | OXYGEN SATURATION: 97 % | RESPIRATION RATE: 18 BRPM | HEIGHT: 71 IN | HEART RATE: 42 BPM | SYSTOLIC BLOOD PRESSURE: 120 MMHG | DIASTOLIC BLOOD PRESSURE: 80 MMHG

## 2019-04-08 DIAGNOSIS — E78.5 DYSLIPIDEMIA: ICD-10-CM

## 2019-04-08 DIAGNOSIS — E11.9 TYPE 2 DIABETES MELLITUS WITHOUT COMPLICATION, WITHOUT LONG-TERM CURRENT USE OF INSULIN (HCC): Primary | ICD-10-CM

## 2019-04-08 DIAGNOSIS — D72.819 LEUKOPENIA, UNSPECIFIED TYPE: ICD-10-CM

## 2019-04-08 PROCEDURE — 1036F TOBACCO NON-USER: CPT | Performed by: FAMILY MEDICINE

## 2019-04-08 PROCEDURE — 3008F BODY MASS INDEX DOCD: CPT | Performed by: FAMILY MEDICINE

## 2019-04-08 PROCEDURE — 99214 OFFICE O/P EST MOD 30 MIN: CPT | Performed by: FAMILY MEDICINE

## 2019-04-14 ENCOUNTER — TRANSCRIBE ORDERS (OUTPATIENT)
Dept: LAB | Facility: HOSPITAL | Age: 54
End: 2019-04-14

## 2019-04-14 ENCOUNTER — TELEPHONE (OUTPATIENT)
Dept: OTHER | Facility: OTHER | Age: 54
End: 2019-04-14

## 2019-04-15 DIAGNOSIS — Z12.11 SCREENING FOR COLON CANCER: Primary | ICD-10-CM

## 2019-04-15 DIAGNOSIS — R73.03 PREDIABETES: ICD-10-CM

## 2019-08-05 ENCOUNTER — TELEPHONE (OUTPATIENT)
Dept: FAMILY MEDICINE CLINIC | Facility: CLINIC | Age: 54
End: 2019-08-05

## 2019-10-13 ENCOUNTER — APPOINTMENT (OUTPATIENT)
Dept: LAB | Facility: HOSPITAL | Age: 54
End: 2019-10-13
Payer: COMMERCIAL

## 2019-10-13 DIAGNOSIS — D72.819 LEUKOPENIA, UNSPECIFIED TYPE: ICD-10-CM

## 2019-10-13 DIAGNOSIS — E78.5 DYSLIPIDEMIA: ICD-10-CM

## 2019-10-13 DIAGNOSIS — Z12.11 SCREENING FOR COLON CANCER: ICD-10-CM

## 2019-10-13 DIAGNOSIS — E11.9 TYPE 2 DIABETES MELLITUS WITHOUT COMPLICATION, WITHOUT LONG-TERM CURRENT USE OF INSULIN (HCC): ICD-10-CM

## 2019-10-13 LAB
ALBUMIN SERPL BCP-MCNC: 4.2 G/DL (ref 3.5–5)
ALP SERPL-CCNC: 86 U/L (ref 46–116)
ALT SERPL W P-5'-P-CCNC: 52 U/L (ref 12–78)
ANION GAP SERPL CALCULATED.3IONS-SCNC: 4 MMOL/L (ref 4–13)
AST SERPL W P-5'-P-CCNC: 23 U/L (ref 5–45)
BASOPHILS # BLD AUTO: 0.01 THOUSANDS/ΜL (ref 0–0.1)
BASOPHILS NFR BLD AUTO: 0 % (ref 0–1)
BILIRUB SERPL-MCNC: 0.84 MG/DL (ref 0.2–1)
BUN SERPL-MCNC: 20 MG/DL (ref 5–25)
CALCIUM SERPL-MCNC: 9 MG/DL (ref 8.3–10.1)
CHLORIDE SERPL-SCNC: 108 MMOL/L (ref 100–108)
CHOLEST SERPL-MCNC: 153 MG/DL (ref 50–200)
CO2 SERPL-SCNC: 26 MMOL/L (ref 21–32)
CREAT SERPL-MCNC: 0.84 MG/DL (ref 0.6–1.3)
CREAT UR-MCNC: 329 MG/DL
EOSINOPHIL # BLD AUTO: 0.12 THOUSAND/ΜL (ref 0–0.61)
EOSINOPHIL NFR BLD AUTO: 4 % (ref 0–6)
ERYTHROCYTE [DISTWIDTH] IN BLOOD BY AUTOMATED COUNT: 12.1 % (ref 11.6–15.1)
EST. AVERAGE GLUCOSE BLD GHB EST-MCNC: 120 MG/DL
GFR SERPL CREATININE-BSD FRML MDRD: 101 ML/MIN/1.73SQ M
GLUCOSE P FAST SERPL-MCNC: 130 MG/DL (ref 65–99)
HBA1C MFR BLD: 5.8 % (ref 4.2–6.3)
HCT VFR BLD AUTO: 42.3 % (ref 36.5–49.3)
HDLC SERPL-MCNC: 38 MG/DL (ref 40–60)
HEMOCCULT STL QL IA: NEGATIVE
HGB BLD-MCNC: 14.4 G/DL (ref 12–17)
IMM GRANULOCYTES # BLD AUTO: 0.01 THOUSAND/UL (ref 0–0.2)
IMM GRANULOCYTES NFR BLD AUTO: 0 % (ref 0–2)
LDLC SERPL CALC-MCNC: 84 MG/DL (ref 0–100)
LYMPHOCYTES # BLD AUTO: 1.47 THOUSANDS/ΜL (ref 0.6–4.47)
LYMPHOCYTES NFR BLD AUTO: 48 % (ref 14–44)
MCH RBC QN AUTO: 30.6 PG (ref 26.8–34.3)
MCHC RBC AUTO-ENTMCNC: 34 G/DL (ref 31.4–37.4)
MCV RBC AUTO: 90 FL (ref 82–98)
MICROALBUMIN UR-MCNC: 27 MG/L (ref 0–20)
MICROALBUMIN/CREAT 24H UR: 8 MG/G CREATININE (ref 0–30)
MONOCYTES # BLD AUTO: 0.28 THOUSAND/ΜL (ref 0.17–1.22)
MONOCYTES NFR BLD AUTO: 9 % (ref 4–12)
NEUTROPHILS # BLD AUTO: 1.22 THOUSANDS/ΜL (ref 1.85–7.62)
NEUTS SEG NFR BLD AUTO: 39 % (ref 43–75)
NRBC BLD AUTO-RTO: 0 /100 WBCS
PLATELET # BLD AUTO: 232 THOUSANDS/UL (ref 149–390)
PMV BLD AUTO: 11.7 FL (ref 8.9–12.7)
POTASSIUM SERPL-SCNC: 4.2 MMOL/L (ref 3.5–5.3)
PROT SERPL-MCNC: 7.9 G/DL (ref 6.4–8.2)
RBC # BLD AUTO: 4.7 MILLION/UL (ref 3.88–5.62)
SODIUM SERPL-SCNC: 138 MMOL/L (ref 136–145)
TRIGL SERPL-MCNC: 157 MG/DL
WBC # BLD AUTO: 3.11 THOUSAND/UL (ref 4.31–10.16)

## 2019-10-13 PROCEDURE — 80061 LIPID PANEL: CPT

## 2019-10-13 PROCEDURE — 85025 COMPLETE CBC W/AUTO DIFF WBC: CPT

## 2019-10-13 PROCEDURE — 83036 HEMOGLOBIN GLYCOSYLATED A1C: CPT

## 2019-10-13 PROCEDURE — 82043 UR ALBUMIN QUANTITATIVE: CPT | Performed by: FAMILY MEDICINE

## 2019-10-13 PROCEDURE — 36415 COLL VENOUS BLD VENIPUNCTURE: CPT

## 2019-10-13 PROCEDURE — G0328 FECAL BLOOD SCRN IMMUNOASSAY: HCPCS

## 2019-10-13 PROCEDURE — 82570 ASSAY OF URINE CREATININE: CPT | Performed by: FAMILY MEDICINE

## 2019-10-13 PROCEDURE — 80053 COMPREHEN METABOLIC PANEL: CPT

## 2019-10-16 ENCOUNTER — OFFICE VISIT (OUTPATIENT)
Dept: FAMILY MEDICINE CLINIC | Facility: CLINIC | Age: 54
End: 2019-10-16
Payer: COMMERCIAL

## 2019-10-16 VITALS
RESPIRATION RATE: 17 BRPM | OXYGEN SATURATION: 98 % | WEIGHT: 217 LBS | SYSTOLIC BLOOD PRESSURE: 118 MMHG | HEART RATE: 72 BPM | DIASTOLIC BLOOD PRESSURE: 76 MMHG | BODY MASS INDEX: 31.07 KG/M2 | HEIGHT: 70 IN | TEMPERATURE: 98.3 F

## 2019-10-16 DIAGNOSIS — R22.2 MASS OF LEFT CHEST WALL: ICD-10-CM

## 2019-10-16 DIAGNOSIS — E78.5 HYPERLIPIDEMIA LDL GOAL <70: ICD-10-CM

## 2019-10-16 DIAGNOSIS — E11.9 TYPE 2 DIABETES MELLITUS WITHOUT COMPLICATION, WITHOUT LONG-TERM CURRENT USE OF INSULIN (HCC): Primary | ICD-10-CM

## 2019-10-16 DIAGNOSIS — Z28.21 REFUSED INFLUENZA VACCINE: ICD-10-CM

## 2019-10-16 DIAGNOSIS — Z12.5 PROSTATE CANCER SCREENING: ICD-10-CM

## 2019-10-16 DIAGNOSIS — Z23 NEED FOR VACCINATION AGAINST STREPTOCOCCUS PNEUMONIAE: ICD-10-CM

## 2019-10-16 PROCEDURE — 3008F BODY MASS INDEX DOCD: CPT | Performed by: FAMILY MEDICINE

## 2019-10-16 PROCEDURE — 99214 OFFICE O/P EST MOD 30 MIN: CPT | Performed by: FAMILY MEDICINE

## 2019-10-16 PROCEDURE — 90471 IMMUNIZATION ADMIN: CPT

## 2019-10-16 PROCEDURE — 90732 PPSV23 VACC 2 YRS+ SUBQ/IM: CPT

## 2019-10-16 RX ORDER — ROSUVASTATIN CALCIUM 5 MG/1
5 TABLET, COATED ORAL DAILY
Qty: 90 TABLET | Refills: 1 | Status: SHIPPED | OUTPATIENT
Start: 2019-10-16 | End: 2020-03-24 | Stop reason: SDUPTHER

## 2019-10-16 NOTE — ASSESSMENT & PLAN NOTE
Lab Results   Component Value Date    HGBA1C 5 8 10/13/2019     Patient is doing very well with diet control  Will continue current management    Patient encouraged to obtain his diabetic eye exam

## 2019-10-16 NOTE — ASSESSMENT & PLAN NOTE
Patient has a slightly elevated triglyceride and low HDL level  Recommend taking fish oil OTC  Discussed that patient's LDL is elevated at 84, and due to diabetes his LDL goal is < 70  Will start treatment with rosuvastatin 5 mg daily  Plan to reassess with labs and follow-up in 6 months

## 2019-10-16 NOTE — PATIENT INSTRUCTIONS
Perfil lipídico   CUIDADO AMBULATORIO:   Un perfil lipídico  o panel lipídico, es un examen de monique para revisar andreas niveles de lípidos  Los lípidos son grasas que no pueden disolverse en la monique  Los [facility] de lípidos aumentan salazar riesgo de enfermedad cardíaca y de un ataque al corazón o de un derrame cerebral  Un perfil lipídico incluye lo siguiente:  · El colesterol total  es el número principal utilizado para los valores de [place]  ¨ Óptimo: Menos de 200 mg/dL    ¨ Límite alto: de 200 a 239 mg/dL    ¨ Alto: 240 mg/dL o mayor    · El colesterol LDL (kadi)  transporta el colesterol y lo deposita en las arterias  [place] puede provocar nhan obstrucción  ¨ Óptimo: 100 mg/dL o más bajo  (70 mg/dL for diabetes)    ¨ Cercano al nivel óptimo: de 100 a 129 mg/dL     ¨ Límite alto: de 130 a 159 mg/dL    ¨ Alto: de 160 a 189 mg/dL    ¨ Muy alto: 190 mg/dL o mayor    · El colesterol HDL (lopez)  remueve el colesterol de salazar cuerpo  ¨ Óptimo: 60 mg/dL o mayor    ¨ Límite de riesgo: de 40 a 59 mg/dL    ¨ Riesgo alto: 40 mg/dL o más bajo    · Los triglicéridos  son  Bold clase diferente de grasa que el colesterol  ¨ Óptimo: 150 mg/dL o más bajo    ¨ Límite alto: de 150 a 199 mg/dL    ¨ Alto: de 200 a 499 mg/dL    ¨ Muy alto: 500 mg/dL o mayor  Cómo prepararse para el examen:  No tome ni coma nada, excepto agua, por 12 a 14 horas antes del examen  Pregunte a salazar médico si usted debería samson andreas medicamentos el día de salazar examen  Lo que necesita saber [facility] examen:  Salazar médico va a charlar con [employer]  Si los resultados son anormales, usted podría necesitar tratamiento para reducir salazar riesgo de enfermedad cardíaca  © 2017 [address] [employer]ason Information is for End User's use only and may not be sold, redistributed or otherwise used for commercial purposes   All illustrations and images included in CareNotes® are the copyrighted property of A D A M , Inc  or Carlos Kena  Esta información es sólo para uso en educación  Salazar intención no es darle un consejo médico sobre enfermedades o tratamientos  Colsulte con salazar Ronalee Mon farmacéutico antes de seguir cualquier régimen médico para saber si es seguro y efectivo para usted

## 2020-03-24 DIAGNOSIS — E78.5 HYPERLIPIDEMIA LDL GOAL <70: ICD-10-CM

## 2020-03-24 RX ORDER — ROSUVASTATIN CALCIUM 5 MG/1
5 TABLET, COATED ORAL DAILY
Qty: 90 TABLET | Refills: 0 | Status: SHIPPED | OUTPATIENT
Start: 2020-03-24 | End: 2020-05-11

## 2020-04-15 ENCOUNTER — TELEMEDICINE (OUTPATIENT)
Dept: FAMILY MEDICINE CLINIC | Facility: CLINIC | Age: 55
End: 2020-04-15
Payer: COMMERCIAL

## 2020-04-15 DIAGNOSIS — U07.1 COVID-19 VIRUS INFECTION: Primary | ICD-10-CM

## 2020-04-15 PROCEDURE — 99213 OFFICE O/P EST LOW 20 MIN: CPT | Performed by: FAMILY MEDICINE

## 2020-05-09 DIAGNOSIS — E78.5 HYPERLIPIDEMIA LDL GOAL <70: ICD-10-CM

## 2020-05-11 RX ORDER — ROSUVASTATIN CALCIUM 5 MG/1
5 TABLET, COATED ORAL DAILY
Qty: 90 TABLET | Refills: 0 | Status: SHIPPED | OUTPATIENT
Start: 2020-05-11 | End: 2020-07-08 | Stop reason: SDUPTHER

## 2020-05-21 ENCOUNTER — TELEPHONE (OUTPATIENT)
Dept: FAMILY MEDICINE CLINIC | Facility: CLINIC | Age: 55
End: 2020-05-21

## 2020-05-29 ENCOUNTER — TELEPHONE (OUTPATIENT)
Dept: FAMILY MEDICINE CLINIC | Facility: CLINIC | Age: 55
End: 2020-05-29

## 2020-07-03 ENCOUNTER — APPOINTMENT (OUTPATIENT)
Dept: LAB | Facility: HOSPITAL | Age: 55
End: 2020-07-03
Payer: COMMERCIAL

## 2020-07-03 DIAGNOSIS — Z12.5 PROSTATE CANCER SCREENING: ICD-10-CM

## 2020-07-03 DIAGNOSIS — E78.5 HYPERLIPIDEMIA LDL GOAL <70: ICD-10-CM

## 2020-07-03 DIAGNOSIS — E11.9 TYPE 2 DIABETES MELLITUS WITHOUT COMPLICATION, WITHOUT LONG-TERM CURRENT USE OF INSULIN (HCC): ICD-10-CM

## 2020-07-03 LAB
ALBUMIN SERPL BCP-MCNC: 3.9 G/DL (ref 3.5–5)
ALP SERPL-CCNC: 80 U/L (ref 46–116)
ALT SERPL W P-5'-P-CCNC: 41 U/L (ref 12–78)
ANION GAP SERPL CALCULATED.3IONS-SCNC: 5 MMOL/L (ref 4–13)
AST SERPL W P-5'-P-CCNC: 24 U/L (ref 5–45)
BILIRUB SERPL-MCNC: 0.71 MG/DL (ref 0.2–1)
BUN SERPL-MCNC: 27 MG/DL (ref 5–25)
CALCIUM SERPL-MCNC: 8.7 MG/DL (ref 8.3–10.1)
CHLORIDE SERPL-SCNC: 110 MMOL/L (ref 100–108)
CHOLEST SERPL-MCNC: 108 MG/DL (ref 50–200)
CO2 SERPL-SCNC: 25 MMOL/L (ref 21–32)
CREAT SERPL-MCNC: 0.7 MG/DL (ref 0.6–1.3)
EST. AVERAGE GLUCOSE BLD GHB EST-MCNC: 120 MG/DL
GFR SERPL CREATININE-BSD FRML MDRD: 109 ML/MIN/1.73SQ M
GLUCOSE P FAST SERPL-MCNC: 114 MG/DL (ref 65–99)
HBA1C MFR BLD: 5.8 %
HDLC SERPL-MCNC: 35 MG/DL
LDLC SERPL CALC-MCNC: 58 MG/DL (ref 0–100)
POTASSIUM SERPL-SCNC: 4.4 MMOL/L (ref 3.5–5.3)
PROT SERPL-MCNC: 7.4 G/DL (ref 6.4–8.2)
SODIUM SERPL-SCNC: 140 MMOL/L (ref 136–145)
TRIGL SERPL-MCNC: 75 MG/DL

## 2020-07-03 PROCEDURE — 84153 ASSAY OF PSA TOTAL: CPT

## 2020-07-03 PROCEDURE — 36415 COLL VENOUS BLD VENIPUNCTURE: CPT

## 2020-07-03 PROCEDURE — 80053 COMPREHEN METABOLIC PANEL: CPT

## 2020-07-03 PROCEDURE — 80061 LIPID PANEL: CPT

## 2020-07-03 PROCEDURE — 83036 HEMOGLOBIN GLYCOSYLATED A1C: CPT

## 2020-07-03 PROCEDURE — 3044F HG A1C LEVEL LT 7.0%: CPT | Performed by: FAMILY MEDICINE

## 2020-07-03 PROCEDURE — 84154 ASSAY OF PSA FREE: CPT

## 2020-07-08 ENCOUNTER — OFFICE VISIT (OUTPATIENT)
Dept: FAMILY MEDICINE CLINIC | Facility: CLINIC | Age: 55
End: 2020-07-08
Payer: COMMERCIAL

## 2020-07-08 VITALS
SYSTOLIC BLOOD PRESSURE: 110 MMHG | HEART RATE: 79 BPM | OXYGEN SATURATION: 98 % | DIASTOLIC BLOOD PRESSURE: 62 MMHG | BODY MASS INDEX: 30.21 KG/M2 | HEIGHT: 70 IN | RESPIRATION RATE: 17 BRPM | WEIGHT: 211 LBS | TEMPERATURE: 96.7 F

## 2020-07-08 DIAGNOSIS — E78.5 HYPERLIPIDEMIA LDL GOAL <70: ICD-10-CM

## 2020-07-08 DIAGNOSIS — D72.819 LEUKOPENIA, UNSPECIFIED TYPE: ICD-10-CM

## 2020-07-08 DIAGNOSIS — E11.9 TYPE 2 DIABETES MELLITUS WITHOUT COMPLICATION, WITHOUT LONG-TERM CURRENT USE OF INSULIN (HCC): Primary | ICD-10-CM

## 2020-07-08 LAB
PSA FREE MFR SERPL: 15 %
PSA FREE SERPL-MCNC: 0.06 NG/ML
PSA SERPL-MCNC: 0.4 NG/ML (ref 0–4)

## 2020-07-08 PROCEDURE — 99214 OFFICE O/P EST MOD 30 MIN: CPT | Performed by: FAMILY MEDICINE

## 2020-07-08 PROCEDURE — 1036F TOBACCO NON-USER: CPT | Performed by: FAMILY MEDICINE

## 2020-07-08 PROCEDURE — 3008F BODY MASS INDEX DOCD: CPT | Performed by: FAMILY MEDICINE

## 2020-07-08 PROCEDURE — 3044F HG A1C LEVEL LT 7.0%: CPT | Performed by: FAMILY MEDICINE

## 2020-07-08 RX ORDER — ROSUVASTATIN CALCIUM 5 MG/1
5 TABLET, COATED ORAL DAILY
Qty: 90 TABLET | Refills: 1 | Status: SHIPPED | OUTPATIENT
Start: 2020-07-08 | End: 2021-01-24

## 2020-07-08 NOTE — PATIENT INSTRUCTIONS
Hiperlipidemia   LO QUE NECESITA SABER:   ¿Qué es la hiperlipidemia? La hiperlipidemia son los [address] de lípidos (Lucia Guzmán) en salazar monique  Estos lípidos incluyen al colesterol o triglicéridos  Los lípidos son producidos por salazar cuerpo  También provienen de los Alvaro Mane usted consume  Salazar cuerpo necesita lípidos para funcionar correctamente, binu los niveles altos aumentan salazar riesgo de enfermedad cardíaca, ataque al corazón y de un derrame cerebral   ¿Qué aumenta mi riesgo para hiperlipidemia? · Antecedentes familiares de niveles altos de lípidos    · Akiko dieta haroon en grasas saturadas, colesterol o calorías     · Consumo elevado de alcohol o fumar    · Falta de actividad física regular    · Condiciones médicas elsa el hipotiroidismo, obesidad o diabetes tipo 2    · Ciertos medicamentos, elsa los de la presión arterial, hormonas y esteroides  ¿Cómo se controla y trata la hiperlipidemia? Salazar médico podría recomendarle que usted realice cambios en salazar estilo de janak para ayudarlo a disminuir los niveles de los lípidos  Es posible que usted también necesite samson medicamentos para disminuir andreas niveles de lípidos  Algunos de los cambios en salazar estilo de janak que podrían ser necesarios incluyen los siguientes:  · Mantenga un peso saludable  Consulte con salazar médico cuánto debería pesar  Solicite que lo ayude a crear un plan para bajar de peso si tiene sobrepeso  La pérdida de peso puede disminuir andreas niveles de colesterol y triglicéridos  · Ejercítese según indicaciones  El ejercicio reduce los niveles de colesterol y lo ayuda a mantener un peso saludable  Yun 40 minutos o más de ejercicio [facility] 3 y 4 días cada semana  Puede dividir el ejercicio en cuatro entrenamientos de 10 minutos en vez de 40 minutos a la vez  Los ejemplos de ejercicio moderado incluyen caminar enérgicamente, nadar o montar en bicicleta  Trabaje con salazar médico para planificar el mejor programa de ejercicios para usted      · No fume   La nicotina y otros químicos de los cigarrillos y cigarros pueden aumentar el riesgo de ataque cardíaco y accidente cerebrovascular  Pida información a salazar médico si usted actualmente fuma y necesita ayuda para dejar de fumar  Los cigarrillos electrónicos o tabaco sin humo todavía contienen nicotina  Consulte con salazar médico antes de [employer]  · Consuma alimentos saludables para el corazón  Hable con salazar dietista acerca de nhan dieta saludable para el corazón  Lo siguiente le ayudará a controlar salazar hiperlipidemia:     ¨ Reduzca la cantidad total de grasa que usted consume  Elija naye magras, leche descremada o con 1% de [place] y productos lácteos bajos en grasa, elsa yogur y [place]ri[place]-barre  Limite o evite el consumo de carne Gurvinder Estrella  La carne kira es haroon en grasas y colesterol  [address] [facility] grasas no saludables por grasa saludables  Las grasa que no son saludables incluyen a las grasas saturadas, grasas transgénicas y el colesterol  Elija margarinas suaves que jaclyn bajas en grasas saturadas y que tengan poca o nada de grasas transgénicas  Las grasas monoinsaturadas son grasas saludables  Estas se encuentran en el aceite de reyes, el aceite de [place], el aguacate y los caitlyn secos  Las grasas poliinsaturadas también son saludables  Estas se encuentran en el pescado, la linaza, las nueces y la soya  ¨ Consuma frutas y [employer]  Estas son bajas en calorías y [place] y son Daryle Ferrari buena willian de vitaminas esenciales  [address] verduras de [employer] oscuro, askew y anaranjado  Los ejemplos incluyen espinaca, col rizada, brócoli y zanahorias  ¨ [place] alimentos ricos en fibras  Elija alimentos de granos enteros y [employer]  Las buenas terry [place] panes integrales o los cereales, los frijoles, chícharos, frutas y verduras  · Pregunte a salzaar médico si usted puede samson alcohol  El alcohol puede aumentar salazar presión arterial y los niveles de triglicéridos   Un trago equivale a 12 onzas de cerveza, 5 onzas de vino o 1 onza y ½ de [address] [address]  Llame al 911 en erwin de presentar lo siguiente:   · Usted tiene alguno de los siguientes signos de un ataque cardíaco:      ¨ Estornudos, presión, o dolor en barreto pecho que dura mas de 5 minutos o regresa  ¨ Malestar o dolor en barreto espalda, faisal, mandíbula, abdomen, o brazo     ¨ Dificultad para respirar    ¨ Náuseas o vómito    ¨ Siente un desvanecimiento o tiene sudores fríos especialmente en el pecho o dificultad para respirar  · Usted tiene alguno de los siguientes signos de derrame cerebral:      ¨ Adormecimiento o caída de un lado de barreto yaakov     ¨ Debilidad en un brazo o nhan pierna    ¨ Confusión o debilidad para hablar    ¨ Mareos o dolor de hannah intenso, o pérdida de la visión  ¿Cuándo estrella comunicarme con mi médico?   · Usted tiene preguntas o inquietudes acerca de barreto condición o cuidado  ACUERDOS SOBRE BARRETO CUIDADO:   Usted tiene el derecho de ayudar a planear barreto cuidado  Aprenda todo lo que pueda sobre barreto condición y elsa darle tratamiento  Discuta andreas opciones de tratamiento con andreas médicos para decidir el cuidado que usted desea recibir  Usted siempre tiene el derecho de rechazar el tratamiento  Esta información es sólo para uso en educación  Barreto intención no es darle un consejo médico sobre enfermedades o tratamientos  Colsulte con barreto Lefty Adry farmacéutico antes de seguir cualquier régimen médico para saber si es seguro y efectivo para usted  © 2017 [address] Information is for End User's use only and may not be sold, redistributed or otherwise used for commercial purposes  All illustrations and images included in CareNotes® are the copyrighted property of A D A M , Inc  or Carlos Escudero

## 2020-07-08 NOTE — PROGRESS NOTES
Assessment/Plan:    Type 2 diabetes mellitus without complication, without long-term current use of insulin (HCC)    Lab Results   Component Value Date    HGBA1C 5 8 (H) 07/03/2020     Blood sugar is well controlled  Patient is not on medications, but is controlling with diet  Continue current management  Leukopenia  Will check CBC with next labs to reassess  Hyperlipidemia LDL goal <70  LDL at goal   Will continue rosuvastatin 5 mg daily  Plan to reassess with lab and follow-up in 6 months  Diagnoses and all orders for this visit:    Type 2 diabetes mellitus without complication, without long-term current use of insulin (HCC)  -     HEMOGLOBIN A1C W/ EAG ESTIMATION; Future    Hyperlipidemia LDL goal <70  -     rosuvastatin (CRESTOR) 5 mg tablet; Take 1 tablet (5 mg total) by mouth daily  -     Lipid Panel with Direct LDL reflex; Future  -     Comprehensive metabolic panel; Future    Leukopenia, unspecified type  -     CBC and Platelet; Future          Subjective:      Patient ID: Shanell Yan is a 48 y o  male  Patient presents to clinic for a follow-up visit  Patient completed the labs ordered, and is here to review results  All results are reviewed with patient, and all questions were answered  Patient has no new complaints today  Hyperlipidemia   This is a chronic problem  The current episode started more than 1 year ago  The problem is controlled  Exacerbating diseases include diabetes and obesity  Pertinent negatives include no chest pain or shortness of breath  Current antihyperlipidemic treatment includes statins  The current treatment provides significant improvement of lipids  There are no compliance problems  Risk factors for coronary artery disease include dyslipidemia, diabetes mellitus, male sex and obesity         The following portions of the patient's history were reviewed and updated as appropriate: allergies, current medications, past family history, past medical history, past social history, past surgical history and problem list     Review of Systems   Respiratory: Negative for shortness of breath  Cardiovascular: Negative for chest pain  Objective:      /62 (BP Location: Left arm, Patient Position: Sitting, Cuff Size: Standard)   Pulse 79   Temp (!) 96 7 °F (35 9 °C) (Tympanic)   Resp 17   Ht 5' 10" (1 778 m)   Wt 95 7 kg (211 lb)   SpO2 98%   BMI 30 28 kg/m²          Physical Exam   Constitutional: He is oriented to person, place, and time  He appears well-developed and well-nourished  He is cooperative  No distress  HENT:   Head: Normocephalic and atraumatic  Right Ear: Hearing and external ear normal    Left Ear: Hearing and external ear normal    Eyes: Conjunctivae and lids are normal    Cardiovascular: Normal rate  Pulmonary/Chest: Effort normal  No respiratory distress  Musculoskeletal: Normal range of motion  Neurological: He is alert and oriented to person, place, and time  Gait normal    Skin: Skin is warm and dry  Psychiatric: He has a normal mood and affect  His speech is normal and behavior is normal    Nursing note and vitals reviewed  BMI Counseling: Body mass index is 30 28 kg/m²  The BMI is above normal  Nutrition recommendations include encouraging healthy choices of fruits and vegetables and limiting drinks that contain sugar  Exercise recommendations include exercising 3-5 times per week           Lab Results   Component Value Date    PSA 0 4 07/03/2020    PSA 0 3 03/23/2019     Lab Results   Component Value Date    HGBA1C 5 8 (H) 07/03/2020     Lab Results   Component Value Date    SODIUM 140 07/03/2020    K 4 4 07/03/2020     (H) 07/03/2020    CO2 25 07/03/2020    AGAP 5 07/03/2020    BUN 27 (H) 07/03/2020    CREATININE 0 70 07/03/2020    GLUC 107 08/21/2016    GLUF 114 (H) 07/03/2020    CALCIUM 8 7 07/03/2020    AST 24 07/03/2020    ALT 41 07/03/2020    ALKPHOS 80 07/03/2020    TP 7 4 07/03/2020 TBILI 0 71 07/03/2020    EGFR 109 07/03/2020     Lab Results   Component Value Date    CHOLESTEROL 108 07/03/2020    CHOLESTEROL 153 10/13/2019    CHOLESTEROL 147 03/23/2019     Lab Results   Component Value Date    HDL 35 (L) 07/03/2020    HDL 38 (L) 10/13/2019    HDL 30 (L) 03/23/2019     Lab Results   Component Value Date    TRIG 75 07/03/2020    TRIG 157 (H) 10/13/2019    TRIG 185 (H) 03/23/2019     No results found for: Denise  Lab Results   Component Value Date    LDLCALC 58 07/03/2020

## 2020-07-08 NOTE — ASSESSMENT & PLAN NOTE
Lab Results   Component Value Date    HGBA1C 5 8 (H) 07/03/2020     Blood sugar is well controlled  Patient is not on medications, but is controlling with diet  Continue current management

## 2020-07-08 NOTE — ASSESSMENT & PLAN NOTE
LDL at goal   Will continue rosuvastatin 5 mg daily  Plan to reassess with lab and follow-up in 6 months

## 2020-11-03 NOTE — PROGRESS NOTES
Diabetic Foot Exam    Patient's shoes and socks removed  Right Foot/Ankle   Right Foot Inspection  Skin Exam: skin normal and skin intact no dry skin, no warmth, no callus, no erythema, no maceration, no abnormal color, no pre-ulcer, no ulcer and no callus                          Toe Exam: ROM and strength within normal limits  Sensory   Vibration: intact  Proprioception: intact   Monofilament testing: intact  Vascular    The right DP pulse is 2+  The right PT pulse is 2+  Left Foot/Ankle  Left Foot Inspection  Skin Exam: skin normal and skin intactno dry skin, no warmth, no erythema, no maceration, normal color, no pre-ulcer, no ulcer and no callus                         Toe Exam: ROM and strength within normal limits                   Sensory   Vibration: intact  Proprioception: intact  Monofilament: intact  Vascular    The left DP pulse is 2+  The left PT pulse is 2+     Assign Risk Category:  No deformity present; ; No weak pulses       Risk: 0

## 2020-11-03 NOTE — PROGRESS NOTES
Diabetic Foot Exam    Patient's shoes and socks removed  Right Foot/Ankle   Right Foot Inspection  Skin Exam: skin normal and skin intact no dry skin, no warmth, no callus, no erythema, no maceration, no abnormal color, no pre-ulcer, no ulcer and no callus                          Toe Exam: ROM and strength within normal limits  Sensory   Vibration: intact  Proprioception: intact   Monofilament testing: intact  Vascular    The right DP pulse is 1+  The right PT pulse is 1+  Left Foot/Ankle  Left Foot Inspection  Skin Exam: skin normal and skin intactno dry skin, no warmth, no erythema, no maceration, normal color, no pre-ulcer, no ulcer and no callus                         Toe Exam: ROM and strength within normal limits                   Sensory   Vibration: intact  Proprioception: intact  Monofilament: intact  Vascular    The left DP pulse is 1+  The left PT pulse is 1+     Assign Risk Category:  No deformity present; ; No weak pulses       Risk: 0

## 2020-12-11 ENCOUNTER — TELEPHONE (OUTPATIENT)
Dept: FAMILY MEDICINE CLINIC | Facility: CLINIC | Age: 55
End: 2020-12-11

## 2020-12-30 ENCOUNTER — TELEPHONE (OUTPATIENT)
Dept: FAMILY MEDICINE CLINIC | Facility: CLINIC | Age: 55
End: 2020-12-30

## 2021-01-18 DIAGNOSIS — E78.5 HYPERLIPIDEMIA LDL GOAL <70: ICD-10-CM

## 2021-01-24 RX ORDER — ROSUVASTATIN CALCIUM 5 MG/1
5 TABLET, COATED ORAL DAILY
Qty: 90 TABLET | Refills: 1 | Status: SHIPPED | OUTPATIENT
Start: 2021-01-24 | End: 2021-11-03

## 2021-10-07 ENCOUNTER — OFFICE VISIT (OUTPATIENT)
Dept: FAMILY MEDICINE CLINIC | Facility: CLINIC | Age: 56
End: 2021-10-07
Payer: COMMERCIAL

## 2021-10-07 VITALS
HEART RATE: 78 BPM | TEMPERATURE: 97.6 F | SYSTOLIC BLOOD PRESSURE: 118 MMHG | DIASTOLIC BLOOD PRESSURE: 74 MMHG | HEIGHT: 70 IN | WEIGHT: 223 LBS | OXYGEN SATURATION: 97 % | RESPIRATION RATE: 18 BRPM | BODY MASS INDEX: 31.92 KG/M2

## 2021-10-07 DIAGNOSIS — Z13.6 SCREENING FOR CARDIOVASCULAR CONDITION: ICD-10-CM

## 2021-10-07 DIAGNOSIS — Z12.11 SCREENING FOR COLORECTAL CANCER: ICD-10-CM

## 2021-10-07 DIAGNOSIS — Z12.12 SCREENING FOR COLORECTAL CANCER: ICD-10-CM

## 2021-10-07 DIAGNOSIS — Z00.00 ANNUAL PHYSICAL EXAM: ICD-10-CM

## 2021-10-07 DIAGNOSIS — Z23 ENCOUNTER FOR IMMUNIZATION: ICD-10-CM

## 2021-10-07 DIAGNOSIS — E78.2 MIXED HYPERLIPIDEMIA: ICD-10-CM

## 2021-10-07 DIAGNOSIS — Z12.5 PROSTATE CANCER SCREENING: ICD-10-CM

## 2021-10-07 DIAGNOSIS — R53.83 FATIGUE, UNSPECIFIED TYPE: ICD-10-CM

## 2021-10-07 DIAGNOSIS — E11.9 TYPE 2 DIABETES MELLITUS WITHOUT COMPLICATION, WITHOUT LONG-TERM CURRENT USE OF INSULIN (HCC): Primary | ICD-10-CM

## 2021-10-07 DIAGNOSIS — R73.03 PREDIABETES: ICD-10-CM

## 2021-10-07 LAB
CREAT UR-MCNC: 164 MG/DL
MICROALBUMIN UR-MCNC: 7.7 MG/L (ref 0–20)
MICROALBUMIN/CREAT 24H UR: 5 MG/G CREATININE (ref 0–30)
SL AMB POCT HEMOGLOBIN AIC: 6.1 (ref ?–6.5)

## 2021-10-07 PROCEDURE — 3044F HG A1C LEVEL LT 7.0%: CPT | Performed by: FAMILY MEDICINE

## 2021-10-07 PROCEDURE — 82570 ASSAY OF URINE CREATININE: CPT | Performed by: FAMILY MEDICINE

## 2021-10-07 PROCEDURE — 83036 HEMOGLOBIN GLYCOSYLATED A1C: CPT | Performed by: FAMILY MEDICINE

## 2021-10-07 PROCEDURE — 82043 UR ALBUMIN QUANTITATIVE: CPT | Performed by: FAMILY MEDICINE

## 2021-10-07 PROCEDURE — 3008F BODY MASS INDEX DOCD: CPT | Performed by: FAMILY MEDICINE

## 2021-10-07 PROCEDURE — 90715 TDAP VACCINE 7 YRS/> IM: CPT

## 2021-10-07 PROCEDURE — 90471 IMMUNIZATION ADMIN: CPT

## 2021-10-07 PROCEDURE — 1036F TOBACCO NON-USER: CPT | Performed by: FAMILY MEDICINE

## 2021-10-07 PROCEDURE — 3725F SCREEN DEPRESSION PERFORMED: CPT | Performed by: FAMILY MEDICINE

## 2021-10-07 PROCEDURE — 99396 PREV VISIT EST AGE 40-64: CPT | Performed by: FAMILY MEDICINE

## 2021-10-07 PROCEDURE — 3061F NEG MICROALBUMINURIA REV: CPT | Performed by: FAMILY MEDICINE

## 2021-10-11 LAB
LEFT EYE DIABETIC RETINOPATHY: NORMAL
RIGHT EYE DIABETIC RETINOPATHY: NORMAL
SEVERITY (EYE EXAM): NORMAL

## 2021-10-23 ENCOUNTER — APPOINTMENT (OUTPATIENT)
Dept: LAB | Facility: HOSPITAL | Age: 56
End: 2021-10-23
Attending: FAMILY MEDICINE
Payer: COMMERCIAL

## 2021-10-23 DIAGNOSIS — E78.2 MIXED HYPERLIPIDEMIA: ICD-10-CM

## 2021-10-23 DIAGNOSIS — R53.83 FATIGUE, UNSPECIFIED TYPE: ICD-10-CM

## 2021-10-23 DIAGNOSIS — Z13.6 SCREENING FOR CARDIOVASCULAR CONDITION: ICD-10-CM

## 2021-10-23 DIAGNOSIS — Z12.5 PROSTATE CANCER SCREENING: ICD-10-CM

## 2021-10-23 LAB
ALBUMIN SERPL BCP-MCNC: 3.8 G/DL (ref 3.5–5)
ALP SERPL-CCNC: 91 U/L (ref 46–116)
ALT SERPL W P-5'-P-CCNC: 50 U/L (ref 12–78)
ANION GAP SERPL CALCULATED.3IONS-SCNC: 5 MMOL/L (ref 4–13)
AST SERPL W P-5'-P-CCNC: 25 U/L (ref 5–45)
BILIRUB SERPL-MCNC: 0.67 MG/DL (ref 0.2–1)
BUN SERPL-MCNC: 22 MG/DL (ref 5–25)
CALCIUM SERPL-MCNC: 9 MG/DL (ref 8.3–10.1)
CHLORIDE SERPL-SCNC: 109 MMOL/L (ref 100–108)
CHOLEST SERPL-MCNC: 101 MG/DL (ref 50–200)
CO2 SERPL-SCNC: 24 MMOL/L (ref 21–32)
CREAT SERPL-MCNC: 0.75 MG/DL (ref 0.6–1.3)
GFR SERPL CREATININE-BSD FRML MDRD: 104 ML/MIN/1.73SQ M
GLUCOSE P FAST SERPL-MCNC: 132 MG/DL (ref 65–99)
HDLC SERPL-MCNC: 35 MG/DL
LDLC SERPL CALC-MCNC: 49 MG/DL (ref 0–100)
NONHDLC SERPL-MCNC: 66 MG/DL
POTASSIUM SERPL-SCNC: 4.1 MMOL/L (ref 3.5–5.3)
PROT SERPL-MCNC: 7.4 G/DL (ref 6.4–8.2)
PSA SERPL-MCNC: 0.5 NG/ML (ref 0–4)
SODIUM SERPL-SCNC: 138 MMOL/L (ref 136–145)
TRIGL SERPL-MCNC: 84 MG/DL
TSH SERPL DL<=0.05 MIU/L-ACNC: 0.93 UIU/ML (ref 0.36–3.74)

## 2021-10-23 PROCEDURE — 36415 COLL VENOUS BLD VENIPUNCTURE: CPT

## 2021-10-23 PROCEDURE — 80061 LIPID PANEL: CPT

## 2021-10-23 PROCEDURE — 80053 COMPREHEN METABOLIC PANEL: CPT

## 2021-10-23 PROCEDURE — G0103 PSA SCREENING: HCPCS

## 2021-10-23 PROCEDURE — 84443 ASSAY THYROID STIM HORMONE: CPT

## 2021-10-25 ENCOUNTER — TELEPHONE (OUTPATIENT)
Dept: INTERNAL MEDICINE CLINIC | Facility: CLINIC | Age: 56
End: 2021-10-25

## 2021-10-25 DIAGNOSIS — R73.03 PREDIABETES: Primary | ICD-10-CM

## 2021-11-03 DIAGNOSIS — E78.5 HYPERLIPIDEMIA LDL GOAL <70: ICD-10-CM

## 2021-11-03 RX ORDER — ROSUVASTATIN CALCIUM 5 MG/1
5 TABLET, COATED ORAL DAILY
Qty: 90 TABLET | Refills: 1 | Status: SHIPPED | OUTPATIENT
Start: 2021-11-03 | End: 2022-04-29

## 2022-01-05 DIAGNOSIS — R73.03 PREDIABETES: ICD-10-CM

## 2022-01-19 ENCOUNTER — OFFICE VISIT (OUTPATIENT)
Dept: FAMILY MEDICINE CLINIC | Facility: CLINIC | Age: 57
End: 2022-01-19
Payer: COMMERCIAL

## 2022-01-19 VITALS
HEART RATE: 77 BPM | DIASTOLIC BLOOD PRESSURE: 76 MMHG | SYSTOLIC BLOOD PRESSURE: 120 MMHG | OXYGEN SATURATION: 98 % | RESPIRATION RATE: 17 BRPM | TEMPERATURE: 97.8 F | BODY MASS INDEX: 32.07 KG/M2 | WEIGHT: 224 LBS | HEIGHT: 70 IN

## 2022-01-19 DIAGNOSIS — L72.3 SEBACEOUS CYST: ICD-10-CM

## 2022-01-19 DIAGNOSIS — E11.9 TYPE 2 DIABETES MELLITUS WITHOUT COMPLICATION, WITHOUT LONG-TERM CURRENT USE OF INSULIN (HCC): Primary | ICD-10-CM

## 2022-01-19 LAB — SL AMB POCT HEMOGLOBIN AIC: 6.1 (ref ?–6.5)

## 2022-01-19 PROCEDURE — 1036F TOBACCO NON-USER: CPT | Performed by: FAMILY MEDICINE

## 2022-01-19 PROCEDURE — 99213 OFFICE O/P EST LOW 20 MIN: CPT | Performed by: FAMILY MEDICINE

## 2022-01-19 PROCEDURE — 3008F BODY MASS INDEX DOCD: CPT | Performed by: FAMILY MEDICINE

## 2022-01-19 PROCEDURE — 83036 HEMOGLOBIN GLYCOSYLATED A1C: CPT | Performed by: FAMILY MEDICINE

## 2022-01-19 PROCEDURE — 3044F HG A1C LEVEL LT 7.0%: CPT | Performed by: FAMILY MEDICINE

## 2022-01-19 NOTE — PROGRESS NOTES
Assessment/Plan:    Type 2 diabetes mellitus without complication, without long-term current use of insulin ([address] )  Patient's A1c is unchanged from last visit  Continues to be 6 1 today  currently on metformin 500 milligrams b i d   No change in weight either  discussed with patient and stressed the importance of lifestyle changes  Patient needs to eat less carbohydrates and also exercise daily  Discussed with patient the potential and complications of uncontrolled diabetes which may lead to but not limited to foot infections leading to amputation, diabetic retinopathy leading to blindness, increased risk for heart attacks and strokes  Patient understands this  Patient will try to start exercising at the gym at work 30 minutes a day  also cut down on what he eats  Patient is currently working on eliminating soda from his diet  completed eye exam by optometrist in October of last year  Will fill out medical release form to obtain record from optometrist   Patient is fully vaccinated against COVID  Lab Results   Component Value Date    HGBA1C 6 1 01/19/2022       Sebaceous cyst  Patient has history of left sebaceous cysts on chest wall  Gwen Sueashlyn seen by general surgeon in 2019  Patient opted for observation at that time  patient is ready for excision  will refer to General surgery for excision of sebaceous cyst     follow-up as needed  Diagnoses and all orders for this visit:    Type 2 diabetes mellitus without complication, without long-term current use of insulin ([facility])  -     POCT hemoglobin A1c    Sebaceous cyst  -     Ambulatory Referral to General Surgery;  Future          Subjective:   Chief Complaint   Patient presents with    Follow-up     Health Maintenance   Topic Date Due    COVID-19 Vaccine (1) Never done    DM Eye Exam  Never done    Colorectal Cancer Screening  10/13/2020    Influenza Vaccine (1) Never done    HIV Screening  10/08/2023 (Originally 9/30/1980)   Juan Pearson HEMOGLOBIN A1C  07/19/2022    Depression Screening  10/07/2022    BMI: Followup Plan  10/07/2022    Annual Physical  10/07/2022    Diabetic Foot Exam  10/07/2022    URINE MICROALBUMIN  10/07/2022    BMI: Adult  01/19/2023    DTaP,Tdap,and Td Vaccines (2 - Td or Tdap) 10/07/2031    Pneumococcal Vaccine: Pediatrics (0 to 5 Years) and At-Risk Patients (6 to 59 Years) (2 of 2 - PPSV23) 09/30/2032    Hepatitis C Screening  Completed    HIB Vaccine  Aged Out    Hepatitis B Vaccine  Aged Out    IPV Vaccine  Aged Out    Hepatitis A Vaccine  Aged Out    Meningococcal ACWY Vaccine  Aged Out    HPV Vaccine  Aged Out        Patient ID: Sally James is a 49 y o  male  HPI    He presents for 3 month follow up  T2DM: Due for a1c recheck today  Last one in 10/2021 was 6 1 on Metformin 500 mg daily  On recheck was 6 1 again  Patient has not lost any weight since last visit  Has not changed his diet  No exercise  Reports that there is chin work  Patient works for [employer]  Has cut down on soda significantly  No creams or sugar in his coffee  HLD: on crestor 5 mg daily  No muscle aches  The following portions of the patient's history were reviewed and updated as appropriate: allergies, current medications, past family history, past medical history, past social history, past surgical history, and problem list     Review of Systems   Constitutional: Negative for chills and fever  HENT: Negative for congestion  Eyes: Negative for visual disturbance  Respiratory: Negative for cough and shortness of breath  Cardiovascular: Negative for chest pain and palpitations  Gastrointestinal: Negative for diarrhea, nausea and vomiting  Genitourinary: Negative for dysuria  Musculoskeletal: Negative for myalgias  Skin: Negative for rash           Objective:  /76 (BP Location: Left arm, Patient Position: Sitting, Cuff Size: Adult)   Pulse 77   Temp 97 8 °F (36 6 °C) (Tympanic)   Resp 17   Ht 5' 10" (1 778 m)   Wt 102 kg (224 lb)   SpO2 98%   BMI 32 14 kg/m²      Physical Exam  Vitals and nursing note reviewed  Constitutional:       General: He is not in acute distress  HENT:      Head: Normocephalic and atraumatic  Eyes:      Conjunctiva/sclera: Conjunctivae normal    Cardiovascular:      Rate and Rhythm: Normal rate and regular rhythm  Pulses: Normal pulses  Heart sounds: No murmur heard  Pulmonary:      Effort: Pulmonary effort is normal  No respiratory distress  Breath sounds: No wheezing, rhonchi or rales  Abdominal:      General: Bowel sounds are normal  There is no distension  Palpations: Abdomen is soft  Tenderness: There is no abdominal tenderness  There is no guarding  Musculoskeletal:         General: No swelling  Skin:     Comments: Patient has a well-circumscribed cystic mass noted on his left chest   Movable, and fluctuant  No discharge noted  no overlying erythema or other skin changes noted   Neurological:      Mental Status: He is alert  This note has been constructed using a voice recognition system  There may be translation, syntax, or grammatical errors  If you have an questions, please contact the dictating provider

## 2022-01-19 NOTE — ASSESSMENT & PLAN NOTE
Patient's A1c is unchanged from last visit  Continues to be 6 1 today  currently on metformin 500 milligrams b i d   No change in weight either  discussed with patient and stressed the importance of lifestyle changes  Patient needs to eat less carbohydrates and also exercise daily  Discussed with patient the potential and complications of uncontrolled diabetes which may lead to but not limited to foot infections leading to amputation, diabetic retinopathy leading to blindness, increased risk for heart attacks and strokes  Patient understands this  Patient will try to start exercising at the gym at work 30 minutes a day  also cut down on what he eats  Patient is currently working on eliminating soda from his diet  completed eye exam by optometrist in October of last year  Will fill out medical release form to obtain record from optometrist   Patient is fully vaccinated against COVID    Lab Results   Component Value Date    HGBA1C 6 1 01/19/2022

## 2022-01-19 NOTE — ASSESSMENT & PLAN NOTE
Patient has history of left sebaceous cysts on chest wall  Kristine Reynoso seen by general surgeon in 2019  Patient opted for observation at that time  patient is ready for excision  will refer to General surgery for excision of sebaceous cyst     follow-up as needed

## 2022-02-03 ENCOUNTER — CONSULT (OUTPATIENT)
Dept: SURGERY | Facility: CLINIC | Age: 57
End: 2022-02-03
Payer: COMMERCIAL

## 2022-02-03 ENCOUNTER — PREP FOR PROCEDURE (OUTPATIENT)
Dept: SURGERY | Facility: CLINIC | Age: 57
End: 2022-02-03

## 2022-02-03 VITALS — BODY MASS INDEX: 32.25 KG/M2 | TEMPERATURE: 97.6 F | HEIGHT: 70 IN | WEIGHT: 225.3 LBS

## 2022-02-03 DIAGNOSIS — R22.2 MASS OF LEFT CHEST WALL: Primary | ICD-10-CM

## 2022-02-03 DIAGNOSIS — L72.3 SEBACEOUS CYST: Primary | ICD-10-CM

## 2022-02-03 PROCEDURE — 99202 OFFICE O/P NEW SF 15 MIN: CPT | Performed by: SURGERY

## 2022-02-03 NOTE — H&P (VIEW-ONLY)
Office Visit - General Surgery  Fer Cook MRN: [id number]  Encounter: [id number]    Assessment and Plan  Problem List Items Addressed This Visit        Musculoskeletal and Integument    Sebaceous cyst - Primary     - will schedule for excision of L chest wall mass  - medical optimization appreciated  - consent obtained, all questions and concerns addressed               Chief Complaint:  Fer Cook is a 49 y o  male who presents for Mass (Consult mass on chest wall )    Subjective   48 yo male who presents for evaluation of L chest wall mass  Pt states that he has had it for approx 2-3 years  Does not cause pain, has not changed in size  No f/c/n/v/cp/sob  Past Medical History:   Diagnosis Date    Chest pain 11/29/2012    Difficulty breathing 11/07/2012       History reviewed  No pertinent surgical history  Family History   Problem Relation Age of Onset    Alzheimer's disease Mother     Diabetes Mother         Diabetes Mellitus       Social History     Tobacco Use    Smoking status: Former Smoker    Smokeless tobacco: Never Used   Substance Use Topics    Alcohol use: No    Drug use: No     Comment: former drug user  Medications  Current Outpatient Medications on File Prior to Visit   Medication Sig Dispense Refill    metFORMIN (GLUCOPHAGE) 500 mg tablet TAKE 1 TABLET (500 MG TOTAL) BY MOUTH 2 (TWO) TIMES A DAY WITH MEALS 60 tablet 2    Multiple Vitamin (MULTIVITAMIN) tablet Take 1 tablet by mouth daily      rosuvastatin (CRESTOR) 5 mg tablet TAKE 1 TABLET (5 MG TOTAL) BY MOUTH DAILY 90 tablet 1     No current facility-administered medications on file prior to visit  Allergies  No Known Allergies    Review of Systems   Respiratory: Negative  Cardiovascular: Negative  Gastrointestinal: Negative  Skin: Negative  All other systems reviewed and are negative        Objective  Vitals:    02/03/22 0928   Temp: 97 6 °F (36 4 °C)       Physical Exam  Constitutional:       Appearance: Normal appearance  He is normal weight  Cardiovascular:      Rate and Rhythm: Normal rate  Pulmonary:      Effort: Pulmonary effort is normal    Abdominal:      Palpations: Abdomen is soft  Skin:     General: Skin is warm and dry  Comments: L chest wall mass - approx 3cm x 2cm in size, freely mobile, nontender, no drainage, no overlying skin changes   Neurological:      Mental Status: He is alert

## 2022-02-03 NOTE — ASSESSMENT & PLAN NOTE
- will schedule for excision of L chest wall mass  - medical optimization appreciated  - consent obtained, all questions and concerns addressed

## 2022-02-03 NOTE — PROGRESS NOTES
Office Visit - General Surgery  Wendy Francis MRN: [id number]  Encounter: [id number]    Assessment and Plan  Problem List Items Addressed This Visit        Musculoskeletal and Integument    Sebaceous cyst - Primary     - will schedule for excision of L chest wall mass  - medical optimization appreciated  - consent obtained, all questions and concerns addressed               Chief Complaint:  Wendy Francis is a 49 y o  male who presents for Mass (Consult mass on chest wall )    Subjective   50 yo male who presents for evaluation of L chest wall mass  Pt states that he has had it for approx 2-3 years  Does not cause pain, has not changed in size  No f/c/n/v/cp/sob  Past Medical History:   Diagnosis Date    Chest pain 11/29/2012    Difficulty breathing 11/07/2012       History reviewed  No pertinent surgical history  Family History   Problem Relation Age of Onset    Alzheimer's disease Mother     Diabetes Mother         Diabetes Mellitus       Social History     Tobacco Use    Smoking status: Former Smoker    Smokeless tobacco: Never Used   Substance Use Topics    Alcohol use: No    Drug use: No     Comment: former drug user  Medications  Current Outpatient Medications on File Prior to Visit   Medication Sig Dispense Refill    metFORMIN (GLUCOPHAGE) 500 mg tablet TAKE 1 TABLET (500 MG TOTAL) BY MOUTH 2 (TWO) TIMES A DAY WITH MEALS 60 tablet 2    Multiple Vitamin (MULTIVITAMIN) tablet Take 1 tablet by mouth daily      rosuvastatin (CRESTOR) 5 mg tablet TAKE 1 TABLET (5 MG TOTAL) BY MOUTH DAILY 90 tablet 1     No current facility-administered medications on file prior to visit  Allergies  No Known Allergies    Review of Systems   Respiratory: Negative  Cardiovascular: Negative  Gastrointestinal: Negative  Skin: Negative  All other systems reviewed and are negative        Objective  Vitals:    02/03/22 0928   Temp: 97 6 °F (36 4 °C)       Physical Exam  Constitutional:       Appearance: Normal appearance  He is normal weight  Cardiovascular:      Rate and Rhythm: Normal rate  Pulmonary:      Effort: Pulmonary effort is normal    Abdominal:      Palpations: Abdomen is soft  Skin:     General: Skin is warm and dry  Comments: L chest wall mass - approx 3cm x 2cm in size, freely mobile, nontender, no drainage, no overlying skin changes   Neurological:      Mental Status: He is alert

## 2022-02-07 ENCOUNTER — TELEPHONE (OUTPATIENT)
Dept: SURGERY | Facility: CLINIC | Age: 57
End: 2022-02-07

## 2022-02-07 NOTE — TELEPHONE ENCOUNTER
Contacted PCP office spoke with Joanne about [facility]  She is reaching out to PCP today and will get back to me

## 2022-02-10 ENCOUNTER — OFFICE VISIT (OUTPATIENT)
Dept: FAMILY MEDICINE CLINIC | Facility: CLINIC | Age: 57
End: 2022-02-10
Payer: COMMERCIAL

## 2022-02-10 VITALS
BODY MASS INDEX: 32.07 KG/M2 | HEIGHT: 70 IN | RESPIRATION RATE: 17 BRPM | HEART RATE: 91 BPM | OXYGEN SATURATION: 98 % | TEMPERATURE: 98 F | WEIGHT: 224 LBS | DIASTOLIC BLOOD PRESSURE: 62 MMHG | SYSTOLIC BLOOD PRESSURE: 118 MMHG

## 2022-02-10 DIAGNOSIS — L72.3 SEBACEOUS CYST: Primary | ICD-10-CM

## 2022-02-10 DIAGNOSIS — Z01.818 PRE-OP EXAMINATION: ICD-10-CM

## 2022-02-10 PROCEDURE — 99213 OFFICE O/P EST LOW 20 MIN: CPT | Performed by: FAMILY MEDICINE

## 2022-02-10 PROCEDURE — 1036F TOBACCO NON-USER: CPT | Performed by: FAMILY MEDICINE

## 2022-02-10 NOTE — ASSESSMENT & PLAN NOTE
Patient is scheduled for excision of sebaceous cyst on 02/14/2022  Patient's past medical history is significant for type 2 diabetes and also hyperlipidemia  Currently well controlled on medications  No history of cardiovascular events  Based on history and exam, patient is low risk for low risk procedure  Patient is clear for surgery    Follow-up as needed

## 2022-02-10 NOTE — PROGRESS NOTES
Assessment/Plan:    Sebaceous cyst  Patient is scheduled for excision of sebaceous cyst on 02/14/2022  Patient's past medical history is significant for type 2 diabetes and also hyperlipidemia  Currently well controlled on medications  No history of cardiovascular events  Based on history and exam, patient is low risk for low risk procedure  Patient is clear for surgery  Follow-up as needed         Diagnoses and all orders for this visit:    Sebaceous cyst    Pre-op examination          Subjective:   Chief Complaint   Patient presents with    Pre-op Exam        Patient ID: Yogi Loyd is a 49 y o  male  HPI    Patient scheduled for excision of sebaceous cyst on 02/14/2022 by Dr Torin Khoury at [address]  Cardiovascular history significant for: HLD controlled on crestor 5 mg daily  No heart attack or stroke    Pulmonary history significant for: na  No asthma, COPD, or other lung disease  No significant bleeding or clotting disorder  No significant anesthesia complications from prior surgeries  The following portions of the patient's history were reviewed and updated as appropriate: allergies, current medications, past family history, past medical history, past social history, past surgical history, and problem list     Past Medical History:   Diagnosis Date    Chest pain 11/29/2012    Diabetes mellitus ([facility] [address] )     Difficulty breathing 11/07/2012    Mixed hyperlipidemia          Current Outpatient Medications:     metFORMIN (GLUCOPHAGE) 500 mg tablet, TAKE 1 TABLET (500 MG TOTAL) BY MOUTH 2 (TWO) TIMES A DAY WITH MEALS, Disp: 60 tablet, Rfl: 2    Multiple Vitamin (MULTIVITAMIN) tablet, Take 1 tablet by mouth daily, Disp: , Rfl:     rosuvastatin (CRESTOR) 5 mg tablet, TAKE 1 TABLET (5 MG TOTAL) BY MOUTH DAILY, Disp: 90 tablet, Rfl: 1    No Known Allergies    History reviewed  No pertinent surgical history      Family History   Problem Relation Age of Onset    Alzheimer's disease Mother     Diabetes Mother         Diabetes Mellitus         Review of Systems   Constitutional: Negative for chills and fever  HENT: Negative for congestion  Eyes: Negative for pain and visual disturbance  Respiratory: Negative for cough and shortness of breath  Cardiovascular: Negative for chest pain and palpitations  Gastrointestinal: Negative for abdominal pain, diarrhea, nausea and vomiting  Genitourinary: Negative for dysuria and hematuria  Musculoskeletal: Negative for myalgias  Skin: Negative for rash  Neurological: Negative for dizziness and headaches  Objective:  /62 (BP Location: Left arm, Patient Position: Sitting, Cuff Size: Adult)   Pulse 91   Temp 98 °F (36 7 °C)   Resp 17   Ht 5' 10" (1 778 m)   Wt 102 kg (224 lb)   SpO2 98%   BMI 32 14 kg/m²      Physical Exam  Vitals and nursing note reviewed  Constitutional:       General: He is not in acute distress  HENT:      Head: Normocephalic and atraumatic  Nose: Nose normal       Mouth/Throat:      Mouth: Mucous membranes are moist       Pharynx: Oropharynx is clear  Eyes:      Conjunctiva/sclera: Conjunctivae normal    Cardiovascular:      Rate and Rhythm: Normal rate and regular rhythm  Pulses: Normal pulses  Heart sounds: No murmur heard  Pulmonary:      Effort: Pulmonary effort is normal  No respiratory distress  Breath sounds: No wheezing, rhonchi or rales  Abdominal:      General: Bowel sounds are normal  There is no distension  Palpations: Abdomen is soft  Tenderness: There is no abdominal tenderness  Musculoskeletal:         General: No swelling  Lymphadenopathy:      Cervical: No cervical adenopathy  Skin:     General: Skin is warm  Capillary Refill: Capillary refill takes less than 2 seconds  Neurological:      Mental Status: He is alert     Psychiatric:         Mood and Affect: Mood normal            This note has been constructed using a voice recognition system  There may be translation, syntax, or grammatical errors  If you have an questions, please contact the dictating provider

## 2022-02-13 ENCOUNTER — ANESTHESIA EVENT (OUTPATIENT)
Dept: PERIOP | Facility: HOSPITAL | Age: 57
End: 2022-02-13
Payer: COMMERCIAL

## 2022-02-14 ENCOUNTER — ANESTHESIA (OUTPATIENT)
Dept: PERIOP | Facility: HOSPITAL | Age: 57
End: 2022-02-14
Payer: COMMERCIAL

## 2022-02-14 ENCOUNTER — HOSPITAL ENCOUNTER (OUTPATIENT)
Facility: HOSPITAL | Age: 57
Setting detail: OUTPATIENT SURGERY
Discharge: HOME | End: 2022-02-14
Attending: SURGERY | Admitting: SURGERY
Payer: COMMERCIAL

## 2022-02-14 VITALS
BODY MASS INDEX: 30.34 KG/M2 | TEMPERATURE: 97.3 F | DIASTOLIC BLOOD PRESSURE: 72 MMHG | HEART RATE: 57 BPM | SYSTOLIC BLOOD PRESSURE: 103 MMHG | HEIGHT: 72 IN | OXYGEN SATURATION: 100 % | WEIGHT: 224 LBS | RESPIRATION RATE: 18 BRPM

## 2022-02-14 DIAGNOSIS — R22.2 MASS OF LEFT CHEST WALL: ICD-10-CM

## 2022-02-14 LAB
GLUCOSE SERPL-MCNC: 111 MG/DL (ref 65–140)
GLUCOSE SERPL-MCNC: 115 MG/DL (ref 65–140)

## 2022-02-14 PROCEDURE — 82948 REAGENT STRIP/BLOOD GLUCOSE: CPT

## 2022-02-14 PROCEDURE — 88304 TISSUE EXAM BY PATHOLOGIST: CPT | Performed by: PATHOLOGY

## 2022-02-14 PROCEDURE — 11402 EXC TR-EXT B9+MARG 1.1-2 CM: CPT | Performed by: SURGERY

## 2022-02-14 RX ORDER — CEFAZOLIN SODIUM 1 G/3ML
INJECTION, POWDER, FOR SOLUTION INTRAMUSCULAR; INTRAVENOUS AS NEEDED
Status: DISCONTINUED | OUTPATIENT
Start: 2022-02-14 | End: 2022-02-14

## 2022-02-14 RX ORDER — PROPOFOL 10 MG/ML
INJECTION, EMULSION INTRAVENOUS AS NEEDED
Status: DISCONTINUED | OUTPATIENT
Start: 2022-02-14 | End: 2022-02-14

## 2022-02-14 RX ORDER — PROPOFOL 10 MG/ML
INJECTION, EMULSION INTRAVENOUS CONTINUOUS PRN
Status: DISCONTINUED | OUTPATIENT
Start: 2022-02-14 | End: 2022-02-14

## 2022-02-14 RX ORDER — BUPIVACAINE HYDROCHLORIDE 5 MG/ML
INJECTION, SOLUTION PERINEURAL AS NEEDED
Status: DISCONTINUED | OUTPATIENT
Start: 2022-02-14 | End: 2022-02-14 | Stop reason: HOSPADM

## 2022-02-14 RX ORDER — SODIUM CHLORIDE, SODIUM LACTATE, POTASSIUM CHLORIDE, CALCIUM CHLORIDE 600; 310; 30; 20 MG/100ML; MG/100ML; MG/100ML; MG/100ML
100 INJECTION, SOLUTION INTRAVENOUS CONTINUOUS
Status: DISCONTINUED | OUTPATIENT
Start: 2022-02-14 | End: 2022-02-14 | Stop reason: HOSPADM

## 2022-02-14 RX ORDER — FENTANYL CITRATE 50 UG/ML
INJECTION, SOLUTION INTRAMUSCULAR; INTRAVENOUS AS NEEDED
Status: DISCONTINUED | OUTPATIENT
Start: 2022-02-14 | End: 2022-02-14

## 2022-02-14 RX ORDER — MIDAZOLAM HYDROCHLORIDE 2 MG/2ML
INJECTION, SOLUTION INTRAMUSCULAR; INTRAVENOUS AS NEEDED
Status: DISCONTINUED | OUTPATIENT
Start: 2022-02-14 | End: 2022-02-14

## 2022-02-14 RX ADMIN — FENTANYL CITRATE 50 MCG: 50 INJECTION INTRAMUSCULAR; INTRAVENOUS at 13:16

## 2022-02-14 RX ADMIN — SODIUM CHLORIDE, SODIUM LACTATE, POTASSIUM CHLORIDE, AND CALCIUM CHLORIDE: .6; .31; .03; .02 INJECTION, SOLUTION INTRAVENOUS at 12:55

## 2022-02-14 RX ADMIN — PROPOFOL 80 MCG/KG/MIN: 10 INJECTION, EMULSION INTRAVENOUS at 13:00

## 2022-02-14 RX ADMIN — MIDAZOLAM 2 MG: 1 INJECTION INTRAMUSCULAR; INTRAVENOUS at 12:52

## 2022-02-14 RX ADMIN — CEFAZOLIN 2000 MG: 1 INJECTION, POWDER, FOR SOLUTION INTRAMUSCULAR; INTRAVENOUS at 13:06

## 2022-02-14 RX ADMIN — PROPOFOL 80 MG: 10 INJECTION, EMULSION INTRAVENOUS at 13:00

## 2022-02-14 RX ADMIN — PROPOFOL 20 MG: 10 INJECTION, EMULSION INTRAVENOUS at 13:16

## 2022-02-14 RX ADMIN — PROPOFOL 30 MG: 10 INJECTION, EMULSION INTRAVENOUS at 13:17

## 2022-02-14 NOTE — DISCHARGE INSTRUCTIONS
Take tylenol/ibuprofen every 6hr as needed for pain  Please avoid picking at the glue over your incision/leave it in place as it helps with skin closure and wound healing  Follow up with the general surgery outpatient office for post op visit in approximately one week (appointment as scheduled in your after visit summary)  Please do not hesitate to call the office should you have any questions or concerns

## 2022-02-14 NOTE — ANESTHESIA PREPROCEDURE EVALUATION
Procedure:  EXCISION  BIOPSY LESION/MASS CHEST WALL (N/A Chest)    Relevant Problems   CARDIO   (+) Hyperlipidemia LDL goal <70      ENDO   (+) Type 2 diabetes mellitus without complication, without long-term current use of insulin (HCC)        Physical Exam    Airway    Mallampati score: II         Dental   No notable dental hx     Cardiovascular  Cardiovascular exam normal    Pulmonary  Pulmonary exam normal     Other Findings        Anesthesia Plan  ASA Score- 2     Anesthesia Type- IV sedation with anesthesia with ASA Monitors  Additional Monitors:   Airway Plan:           Plan Factors-Exercise tolerance (METS): >4 METS  Chart reviewed  Patient summary reviewed  Induction- intravenous  Postoperative Plan-     Informed Consent- Anesthetic plan and risks discussed with patient  I personally reviewed this patient with the CRNA  Discussed and agreed on the Anesthesia Plan with the CRNA  Karrie Nissen

## 2022-02-14 NOTE — OP NOTE
PERATIVE REPORT  PATIENT NAME: Fany El    :  1965  MRN: [id number]  Pt Location: [facility] OR ROOM 08    SURGERY DATE: 2022    Surgeon(s) and Role:     Alexsander Ferreira, DO - Primary     * Imelda Regalado MD - Assisting    Preop Diagnosis:  Mass of left chest wall [R22 2]    Post-Op Diagnosis Codes:     * Mass of left chest wall [R22 2]    Procedure(s) (LRB):  EXCISION  BIOPSY LESION/MASS CHEST WALL (Left)    Specimen(s):  ID Type Source Tests Collected by Time Destination   1 : Left Chest Wall Mass Tissue Chest Wall TISSUE EXAM Jahaira Pointer To, DO 2022 1318        Estimated Blood Loss:   Minimal    Drains:  * No LDAs found *    Anesthesia Type:   IV Sedation with Anesthesia    Operative Indications: Mass of left chest wall [R22 2]      Operative Findings:  Left chest wall sebaceous cyst 3T3IR    Complications:   None    Procedure and Technique:  Patient was brought into the operation room, identity was confirmed verbally and via patient wrist band, patient was then moved from his hospital bed to the OR table  Patient was then put under IV sedation, he was prepped and draped in the usual sterile fashion, and a time-out was performed with all participating team members before proceeding with the operation  Left chest wall mass was identified, the site was locally anesthetized with 0 5% Marcaine, and a 15 blade was used to make an incision over the most prominent aspect of the mass  A sebaceous cyst measuring approximately 2 x 2 cm was identified, the cyst was dissected apart from surrounding structures using a hemostat and Metzenbaum scissors, the cyst was completely excised and sent as a routine specimen to pathology for further analysis  The surgical site was then examined for any residual bleeding or oozing, excellent hemostasis was achieved, the site was then copiously irrigated with sterile saline   3-0 Vicryl was used for a single deep dermal stitch, skin was then closed using 4-0 Monocryl for a running subcuticular stitch  Exofin glue was applied for additional reinforcement of the incision  All counts at the end of the case were correct  Patient tolerated the procedure well, without complications, was transported to PACU in stable condition  Dr Ferreira was present for the entire procedure      Patient Disposition:  PACU       SIGNATURE: Hayden Garcia MD  DATE: February 14, 2022  TIME: 2:02 PM

## 2022-02-14 NOTE — INTERVAL H&P NOTE
H&P reviewed  After examining the patient I find no changes in the patients condition since the H&P had been written      Vitals:    02/14/22 1050   BP: 122/88   Pulse: 68   Resp: 19   Temp: (!) 96 6 °F (35 9 °C)   SpO2: 100%     HEENT: atraumatic, EOMI  L chest wall: freely-mobile, nontender mass approx 2 5cm x 2 5cm in size  Ext: no edema

## 2022-02-21 ENCOUNTER — OFFICE VISIT (OUTPATIENT)
Dept: SURGERY | Facility: CLINIC | Age: 57
End: 2022-02-21

## 2022-02-21 VITALS — TEMPERATURE: 97.6 F | BODY MASS INDEX: 30.45 KG/M2 | WEIGHT: 224.8 LBS | HEIGHT: 72 IN

## 2022-02-21 DIAGNOSIS — R22.2 CHEST WALL MASS: Primary | ICD-10-CM

## 2022-02-21 PROCEDURE — 3008F BODY MASS INDEX DOCD: CPT | Performed by: FAMILY MEDICINE

## 2022-02-21 PROCEDURE — 99024 POSTOP FOLLOW-UP VISIT: CPT | Performed by: SURGERY

## 2022-02-21 NOTE — PROGRESS NOTES
Office Visit - General Surgery  Fany El MRN: [id number]  Encounter: [id number]    Assessment and Plan  Problem List Items Addressed This Visit     None          Chief Complaint:  Fany El is a 49 y o  male who presents for Post-op (p/o left chest wall mass )    Subjective  53yoM w PMH of left chest wall sebaceous cyst, presenting for 1wk f/u post op  Patient doing well, has had no issues post operatively, tolerating regular diet and having normal bowel function  Has had no issues with his left chest incision site, glue remains over the incision and pt instructed to let the glue fall off on its own  Final pathology came back as epidermal inclusion cyst, this was discussed with the pt  He will follow up as needed  Past Medical History:   Diagnosis Date    Chest pain 11/29/2012    Diabetes mellitus ([address] )     Difficulty breathing 11/07/2012    Mixed hyperlipidemia        Past Surgical History:   Procedure Laterality Date    ME EXC SKIN BENIG 3 1-4 CM TRUNK,ARM,LEG Left 2/14/2022    Procedure: EXCISION  BIOPSY LESION/MASS CHEST WALL;  Surgeon: Jahaira Pointer To, DO;  Location: [facility];  Service: General       Family History   Problem Relation Age of Onset    Alzheimer's disease Mother     Diabetes Mother         Diabetes Mellitus       Social History     Tobacco Use    Smoking status: Former Smoker    Smokeless tobacco: Never Used   Substance Use Topics    Alcohol use: No    Drug use: No     Comment: former drug user  Medications  Current Outpatient Medications on File Prior to Visit   Medication Sig Dispense Refill    metFORMIN (GLUCOPHAGE) 500 mg tablet TAKE 1 TABLET (500 MG TOTAL) BY MOUTH 2 (TWO) TIMES A DAY WITH MEALS 60 tablet 2    Multiple Vitamin (MULTIVITAMIN) tablet Take 1 tablet by mouth daily      rosuvastatin (CRESTOR) 5 mg tablet TAKE 1 TABLET (5 MG TOTAL) BY MOUTH DAILY 90 tablet 1     No current facility-administered medications on file prior to visit  Allergies  No Known Allergies    Review of Systems   Constitutional: Negative for appetite change, chills and fever  Respiratory: Negative for shortness of breath  Cardiovascular: Negative for chest pain  Gastrointestinal: Negative for abdominal distention, abdominal pain, constipation, diarrhea, nausea and vomiting  Musculoskeletal: Negative for back pain  Skin: Negative for color change and rash  Neurological: Negative for dizziness, light-headedness and headaches  Psychiatric/Behavioral: Negative for confusion  All other systems reviewed and are negative  Objective  Vitals:    02/21/22 0817   Temp: 97 6 °F (36 4 °C)       Physical Exam  Vitals reviewed  Constitutional:       Appearance: Normal appearance  HENT:      Head: Normocephalic and atraumatic  Mouth/Throat:      Mouth: Mucous membranes are moist    Cardiovascular:      Rate and Rhythm: Normal rate and regular rhythm  Pulses: Normal pulses  Heart sounds: Normal heart sounds  Pulmonary:      Effort: Pulmonary effort is normal       Breath sounds: Normal breath sounds  Chest:       Abdominal:      General: There is no distension  Palpations: Abdomen is soft  Tenderness: There is no abdominal tenderness  Musculoskeletal:         General: Normal range of motion  Cervical back: Normal range of motion  Skin:     General: Skin is warm  Capillary Refill: Capillary refill takes less than 2 seconds  Findings: No erythema  Neurological:      Mental Status: He is alert and oriented to person, place, and time     Psychiatric:         Mood and Affect: Mood normal

## 2022-04-01 ENCOUNTER — VBI (OUTPATIENT)
Dept: ADMINISTRATIVE | Facility: OTHER | Age: 57
End: 2022-04-01

## 2022-04-01 NOTE — TELEPHONE ENCOUNTER
Upon review of the In Basket request we were able to locate, review, and update the patient chart as requested for Diabetic Eye Exam     Any additional questions or concerns should be emailed to the Practice Liaisons via [email or web address] email, please do not reply via In Basket      Thank you  Debra Noel MA

## 2022-04-05 DIAGNOSIS — R73.03 PREDIABETES: ICD-10-CM

## 2022-04-29 DIAGNOSIS — E78.5 HYPERLIPIDEMIA LDL GOAL <70: ICD-10-CM

## 2022-04-29 RX ORDER — ROSUVASTATIN CALCIUM 5 MG/1
5 TABLET, COATED ORAL DAILY
Qty: 90 TABLET | Refills: 1 | Status: SHIPPED | OUTPATIENT
Start: 2022-04-29

## 2022-05-25 ENCOUNTER — OFFICE VISIT (OUTPATIENT)
Dept: FAMILY MEDICINE CLINIC | Facility: CLINIC | Age: 57
End: 2022-05-25
Payer: COMMERCIAL

## 2022-05-25 VITALS
DIASTOLIC BLOOD PRESSURE: 60 MMHG | TEMPERATURE: 97 F | SYSTOLIC BLOOD PRESSURE: 110 MMHG | BODY MASS INDEX: 30.07 KG/M2 | HEART RATE: 82 BPM | HEIGHT: 72 IN | RESPIRATION RATE: 17 BRPM | WEIGHT: 222 LBS | OXYGEN SATURATION: 98 %

## 2022-05-25 DIAGNOSIS — E11.9 TYPE 2 DIABETES MELLITUS WITHOUT COMPLICATION, WITHOUT LONG-TERM CURRENT USE OF INSULIN (HCC): Primary | ICD-10-CM

## 2022-05-25 DIAGNOSIS — E78.5 HYPERLIPIDEMIA LDL GOAL <70: ICD-10-CM

## 2022-05-25 DIAGNOSIS — Z23 ENCOUNTER FOR IMMUNIZATION: ICD-10-CM

## 2022-05-25 DIAGNOSIS — H93.8X2 EAR MASS, LEFT: ICD-10-CM

## 2022-05-25 PROCEDURE — 90471 IMMUNIZATION ADMIN: CPT

## 2022-05-25 PROCEDURE — 3008F BODY MASS INDEX DOCD: CPT | Performed by: FAMILY MEDICINE

## 2022-05-25 PROCEDURE — 3725F SCREEN DEPRESSION PERFORMED: CPT | Performed by: FAMILY MEDICINE

## 2022-05-25 PROCEDURE — 1036F TOBACCO NON-USER: CPT | Performed by: FAMILY MEDICINE

## 2022-05-25 PROCEDURE — 99214 OFFICE O/P EST MOD 30 MIN: CPT | Performed by: FAMILY MEDICINE

## 2022-05-25 PROCEDURE — 90677 PCV20 VACCINE IM: CPT

## 2022-05-25 NOTE — PATIENT INSTRUCTIONS
Common Wart   AMBULATORY CARE:   A common wart  is a thick, rough, skin growth caused by human papillomavirus virus (HPV)  HPV spreads by skin-to-skin contact or contact with contaminated surfaces  Common warts are benign (not cancer)  Signs and symptoms:  Common warts may form anywhere on your body, but are most common on hands, fingers, knees, feet, and elbows  You may have any of the following:  A raised, round growth    Black dots in the center of your wart, or bleeding if the wart is scratched or scraped    Soreness around your wart    Call your doctor or dermatologist if:   Your wart returns or does not go away after treatment  Your wart grows larger, or begins to spread or cluster  You have a wart on your face, genitals, or rectum  Your wart bleeds, becomes painful, or drains pus  You have questions about your condition or care  Treatment  is based on the size, location, and number of warts you have  Some warts go away on their own without treatment  Some warts return after treatment  You may need any of the following:  Home treatments  may help remove the wart:    Salicylic acid  helps dry and remove the wart  Before you apply salicylic acid, soak the wart in warm water for up to 20 minutes  Keep your wart damp  Apply a small amount of salicylic acid directly to your wart  Do not apply salicylic acid to healthy skin  Cover the wart as directed  It is best to do this at bedtime  When you wake, use a pumice stone (a rough stone) or nail file to gently remove dead skin  Repeat as directed  Duct tape  helps dry and remove the wart  You may be directed to leave the duct tape on for 6 days  On day 7, take the tape off and soak the wart in warm water for 5 minutes  Gently scrape the wart with a pumice stone or nail file  Then apply a new piece of duct tape and follow the same steps until the wart is gone  Cantharidin  burns the wart and causes it to blister   Your healthcare provider will apply this liquid medicine to your wart  He or she may remove the blister or dead skin at a later time  Liquid nitrogen  freezes the wart and helps it fall off  This treatment may be repeated 2 to 4 times  Injections  (shots) may help kill the virus that is causing the wart  Other treatments  may be needed to remove your wart  These treatments may include phototherapy, laser therapy, or surgery to remove the wart  Follow up with your doctor or dermatologist as directed:  Write down your questions so you remember to ask them during your visits  © Copyright [employer] 2022 Information is for End User's use only and may not be sold, redistributed or otherwise used for commercial purposes  All illustrations and images included in CareNotes® are the copyrighted property of [employer]  or Robert Gleason  The above information is an  only  It is not intended as medical advice for individual conditions or treatments  Talk to your doctor, nurse or pharmacist before following any medical regimen to see if it is safe and effective for you

## 2022-05-25 NOTE — ASSESSMENT & PLAN NOTE
Patient has a left ear mass  Will refer to ENT for further evaluation as patient would like to have this looked at sooner    Derm may take several months  Advised patient to follow up as needed

## 2022-05-25 NOTE — PROGRESS NOTES
Assessment/Plan:    Hyperlipidemia LDL goal <70  No myaglias  Stable  Continue with Crestor 5 mg daily    Ear mass, left  Patient has a left ear mass  Will refer to ENT for further evaluation as patient would like to have this looked at sooner  Derm may take several months  Advised patient to follow up as needed    Type 2 diabetes mellitus without complication, without long-term current use of insulin ([facility])  Last a1c 6 1  Stable, advised patient to continue with metformin 500 mg BID  Patient did lose 2 pounds since last visit  Recommend working on diet and exercise  If no improvement by next visit consider starting patient on dpp4 inhibitor or sglt2 inhibitor for further glucose control  Follow up in 6 months      Lab Results   Component Value Date    HGBA1C 6 1 01/19/2022        Diagnoses and all orders for this visit:    Type 2 diabetes mellitus without complication, without long-term current use of insulin ([facility] [address] )    Hyperlipidemia LDL goal <70    Ear mass, left  -     Ambulatory Referral to Otolaryngology;  Future    Encounter for immunization  Comments:  patient consented and was given prevnar today  Orders:  -     Pneumococcal Conjugate Vaccine 20-valent (Pcv20)          Subjective:   Chief Complaint   Patient presents with    Follow-up     Health Maintenance   Topic Date Due    COVID-19 Vaccine (1) Never done    Colorectal Cancer Screening  10/13/2020    HEMOGLOBIN A1C  07/19/2022    BMI: Followup Plan  10/07/2022    HIV Screening  10/08/2023 (Originally 9/30/1980)    Influenza Vaccine (Season Ended) 09/01/2022    Annual Physical  10/07/2022    Diabetic Foot Exam  10/07/2022    URINE MICROALBUMIN  10/07/2022    DM Eye Exam  10/11/2022    Depression Screening  05/25/2023    BMI: Adult  05/25/2023    DTaP,Tdap,and Td Vaccines (2 - Td or Tdap) 10/07/2031    Hepatitis C Screening  Completed    Pneumococcal Vaccine: Pediatrics (0 to 5 Years) and At-Risk Patients (6 to 59 Years)  Completed    HIB Vaccine  Aged Out    Hepatitis B Vaccine  Aged Out    IPV Vaccine  Aged Out    Hepatitis A Vaccine  Aged Out    Meningococcal ACWY Vaccine  Aged Out    HPV Vaccine  Aged Out        Patient ID: Cindy Pantoja is a 49 y o  male  [facility]    # [id number]    Patient presents for 3 months follow up    DMT2: on metformin  No SE  Doing well  Last A1c 6 1    Ear mass: Patient c/o left inner ear lob mass for the past year  Has gotten progressively bigger over the past several months  Pruritic at times  Wants to know if this is bad and if he needs to follow up with a specialist    The following portions of the patient's history were reviewed and updated as appropriate: allergies, current medications, past family history, past medical history, past social history, past surgical history, and problem list     Review of Systems   Constitutional: Negative for chills and fever  HENT: Negative for congestion  Respiratory: Negative for cough and shortness of breath  Cardiovascular: Negative for chest pain and palpitations  Gastrointestinal: Negative for diarrhea, nausea and vomiting  Genitourinary: Negative for dysuria  Musculoskeletal: Negative for myalgias  Skin: Negative for rash  Mass on ear   Neurological: Negative for dizziness and headaches  Objective:  /60 (BP Location: Left arm, Patient Position: Sitting, Cuff Size: Adult)   Pulse 82   Temp (!) 97 °F (36 1 °C) (Tympanic)   Resp 17   Ht 6' (1 829 m)   Wt 101 kg (222 lb)   SpO2 98%   BMI 30 11 kg/m²      Physical Exam  Vitals reviewed  Constitutional:       General: He is not in acute distress  HENT:      Head: Normocephalic and atraumatic  Right Ear: Tympanic membrane normal       Left Ear: Tympanic membrane normal       Ears:      Comments: Mass on left ear  Flashy appearance  Non tender   No redness  Eyes:      Conjunctiva/sclera: Conjunctivae normal    Cardiovascular:      Rate and Rhythm: Normal rate and regular rhythm  Pulses: Normal pulses  Heart sounds: No murmur heard  Pulmonary:      Effort: Pulmonary effort is normal  No respiratory distress  Breath sounds: No wheezing, rhonchi or rales  Musculoskeletal:         General: No swelling  Lymphadenopathy:      Cervical: No cervical adenopathy  Neurological:      Mental Status: He is alert  This note has been constructed using a voice recognition system  There may be translation, syntax, or grammatical errors  If you have an questions, please contact the dictating provider

## 2022-11-30 ENCOUNTER — OFFICE VISIT (OUTPATIENT)
Dept: FAMILY MEDICINE CLINIC | Facility: CLINIC | Age: 57
End: 2022-11-30

## 2022-11-30 VITALS
RESPIRATION RATE: 16 BRPM | HEIGHT: 72 IN | TEMPERATURE: 98.7 F | WEIGHT: 223.8 LBS | DIASTOLIC BLOOD PRESSURE: 80 MMHG | SYSTOLIC BLOOD PRESSURE: 118 MMHG | HEART RATE: 98 BPM | OXYGEN SATURATION: 98 % | BODY MASS INDEX: 30.31 KG/M2

## 2022-11-30 DIAGNOSIS — R73.03 PREDIABETES: ICD-10-CM

## 2022-11-30 DIAGNOSIS — Z00.00 ANNUAL PHYSICAL EXAM: Primary | ICD-10-CM

## 2022-11-30 DIAGNOSIS — E11.9 TYPE 2 DIABETES MELLITUS WITHOUT COMPLICATION, WITHOUT LONG-TERM CURRENT USE OF INSULIN (HCC): ICD-10-CM

## 2022-11-30 DIAGNOSIS — E78.5 HYPERLIPIDEMIA LDL GOAL <70: ICD-10-CM

## 2022-11-30 DIAGNOSIS — Z12.5 PROSTATE CANCER SCREENING: ICD-10-CM

## 2022-11-30 DIAGNOSIS — Z12.11 COLON CANCER SCREENING: ICD-10-CM

## 2022-11-30 DIAGNOSIS — Z13.0 SCREENING FOR DEFICIENCY ANEMIA: ICD-10-CM

## 2022-11-30 PROBLEM — U07.1 COVID-19 VIRUS INFECTION: Status: RESOLVED | Noted: 2020-04-15 | Resolved: 2022-11-30

## 2022-11-30 RX ORDER — ROSUVASTATIN CALCIUM 5 MG/1
5 TABLET, COATED ORAL DAILY
Qty: 90 TABLET | Refills: 1 | Status: SHIPPED | OUTPATIENT
Start: 2022-11-30

## 2022-11-30 NOTE — ASSESSMENT & PLAN NOTE
Lab Results   Component Value Date    HGBA1C 6 1 01/19/2022   Recheck  Refills given  Microal creatinine delroy collected today

## 2022-11-30 NOTE — PROGRESS NOTES
ADULT ANNUAL [address] [facility]    NAME: Florentin Jo  AGE: 56 y o  SEX: male  : 1965     DATE: 2022     Assessment and Plan:     Problem List Items Addressed This Visit        Endocrine    Type 2 diabetes mellitus without complication, without long-term current use of insulin ([address] )       Lab Results   Component Value Date    HGBA1C 6 1 2022   Recheck  Refills given  Microal creatinine samle collected today  Relevant Medications    metFORMIN (GLUCOPHAGE) 500 mg tablet    Other Relevant Orders    Microalbumin / creatinine urine ratio    Hemoglobin A1C    Lipid panel    Basic metabolic panel    Comprehensive metabolic panel    Lipid panel    Hemoglobin A1C       Other    Annual physical exam - Primary     Normal physical exam    Discussed colon cancer screening  Wants to try the cologuard first  No FH colon cancer  Discussed prostate cancer screening  He opts in for screening  Ordered today  Hyperlipidemia LDL goal <70    Relevant Medications    rosuvastatin (CRESTOR) 5 mg tablet   Other Visit Diagnoses     Colon cancer screening        Relevant Orders    Cologuard    Prostate cancer screening        Relevant Orders    PSA, Total Screen    Screening for deficiency anemia        Relevant Orders    CBC and differential    Prediabetes        Relevant Medications    metFORMIN (GLUCOPHAGE) 500 mg tablet          Immunizations and preventive care screenings were discussed with patient today  Appropriate education was printed on patient's after visit summary  Declines flu shot  Discussed risks and benefits of prostate cancer screening  We discussed the controversial history of PSA screening for prostate cancer in the [place] as well as the risk of over detection and over treatment of prostate cancer by way of PSA screening    The patient understands that PSA blood testing is an imperfect way to screen for prostate cancer and that elevated PSA levels in the blood may also be caused by infection, inflammation, prostatic trauma or manipulation, urological procedures, or by benign prostatic enlargement  The role of the digital rectal examination in prostate cancer screening was also discussed and I discussed with him that there is large interobserver variability in the findings of digital rectal examination  Counseling:  Alcohol/drug use: discussed moderation in alcohol intake, the recommendations for healthy alcohol use, and avoidance of illicit drug use  Dental Health: discussed importance of regular tooth brushing, flossing, and dental visits  Exercise: the importance of regular exercise/physical activity was discussed  Recommend exercise 3-5 times per week for at least 30 minutes  Return for chronic conditions with labs   Chief Complaint:     Chief Complaint   Patient presents with   • 6 month follow up       History of Present Illness:     Adult Annual Physical   Patient here for a comprehensive physical exam  The patient reports no problems  Diet and Physical Activity  Diet/Nutrition: poor diet  Exercise: no formal exercise  Depression Screening  PHQ-2/9 Depression Screening    Little interest or pleasure in doing things: 0 - not at all  Feeling down, depressed, or hopeless: 0 - not at all  PHQ-2 Score: 0  PHQ-2 Interpretation: Negative depression screen       General Health  Sleep: sleeps well  Hearing: no issues  Vision: goes for regular eye exams  Dental: edentulous    Health  Symptoms include: none     Review of Systems:     Review of Systems   Constitutional: Negative for activity change, appetite change, fever and unexpected weight change  HENT: Negative for ear pain, postnasal drip and rhinorrhea  Eyes: Negative for photophobia and pain  Respiratory: Negative for cough, shortness of breath and wheezing      Cardiovascular: Negative for chest pain, palpitations and leg swelling  Gastrointestinal: Negative for abdominal pain, blood in stool, nausea and vomiting  Endocrine: Negative for polydipsia and polyuria  Genitourinary: Negative for difficulty urinating, hematuria and urgency  Musculoskeletal: Negative for myalgias  Skin: Negative for rash  Neurological: Negative for dizziness  Psychiatric/Behavioral: Negative for confusion and sleep disturbance  Past Medical History:     Past Medical History:   Diagnosis Date   • Chest pain 11/29/2012   • Diabetes mellitus ([facility] [address] )    • Difficulty breathing 11/07/2012   • Mixed hyperlipidemia       Past Surgical History:     Past Surgical History:   Procedure Laterality Date   • RI EXC SKIN BENIG 3 1-4 CM TRUNK,ARM,LEG Left 2/14/2022    Procedure: EXCISION  BIOPSY LESION/MASS CHEST WALL;  Surgeon: Kaycee Ferreira DO;  Location: [facility];  Service: General      Family History:     Family History   Problem Relation Age of Onset   • Alzheimer's disease Mother    • Diabetes Mother         Diabetes Mellitus      Social History:     Social History     Socioeconomic History   • Marital status: /Civil Union     Spouse name: None   • Number of children: None   • Years of education: None   • Highest education level: None   Occupational History   • None   Tobacco Use   • Smoking status: Former   • Smokeless tobacco: Never   Substance and Sexual Activity   • Alcohol use: No   • Drug use: No     Comment: former drug user      • Sexual activity: Not Currently   Other Topics Concern   • None   Social History Narrative   • None     Social Determinants of Health     Financial Resource Strain: Not on file   Food Insecurity: Not on file   Transportation Needs: Not on file   Physical Activity: Not on file   Stress: Not on file   Social Connections: Not on file   Intimate Partner Violence: Not on file   Housing Stability: Not on file      Current Medications:     Current Outpatient Medications   Medication Sig Dispense Refill   • metFORMIN (GLUCOPHAGE) 500 mg tablet Take 1 tablet (500 mg total) by mouth 2 (two) times a day with meals 180 tablet 1   • Multiple Vitamin (MULTIVITAMIN) tablet Take 1 tablet by mouth daily     • rosuvastatin (CRESTOR) 5 mg tablet Take 1 tablet (5 mg total) by mouth daily 90 tablet 1     No current facility-administered medications for this visit  Allergies:     No Known Allergies   Physical Exam:     /80   Pulse 98   Temp 98 7 °F (37 1 °C) (Tympanic)   Resp 16   Ht 6' (1 829 m)   Wt 102 kg (223 lb 12 8 oz)   SpO2 98%   BMI 30 35 kg/m²     Physical Exam  Constitutional:       General: He is not in acute distress  Appearance: He is well-developed and well-nourished  He is not diaphoretic  HENT:      Head: Normocephalic and atraumatic  Right Ear: External ear normal       Left Ear: External ear normal       Mouth/Throat:      Mouth: Oropharynx is clear and moist    Eyes:      General: No scleral icterus  Extraocular Movements: EOM normal       Conjunctiva/sclera: Conjunctivae normal       Pupils: Pupils are equal, round, and reactive to light  Cardiovascular:      Rate and Rhythm: Normal rate and regular rhythm  Pulses: Intact distal pulses  no weak pulses          Dorsalis pedis pulses are 2+ on the right side and 2+ on the left side  Posterior tibial pulses are 2+ on the right side and 2+ on the left side  Heart sounds: Normal heart sounds  No murmur heard  No friction rub  No gallop  Pulmonary:      Effort: Pulmonary effort is normal  No respiratory distress  Breath sounds: Normal breath sounds  No wheezing or rales  Abdominal:      General: Bowel sounds are normal  There is no distension  Palpations: Abdomen is soft  There is no mass  Tenderness: There is no abdominal tenderness  There is no guarding  Musculoskeletal:         General: No tenderness or edema  Cervical back: Normal range of motion     Feet:      Right foot: Skin integrity: No ulcer, skin breakdown, erythema, warmth, callus or dry skin  Left foot:      Skin integrity: No ulcer, skin breakdown, erythema, warmth, callus or dry skin  Skin:     General: Skin is warm and dry  Findings: No rash  Neurological:      Mental Status: He is alert and oriented to person, place, and time  Cranial Nerves: No cranial nerve deficit  Motor: No abnormal muscle tone  Psychiatric:         Mood and Affect: Mood and affect normal       Patient's shoes and socks removed  Right Foot/Ankle   Right Foot Inspection  Skin Exam: skin normal and skin intact  No dry skin, no warmth, no callus, no erythema, no maceration, no abnormal color, no pre-ulcer, no ulcer and no callus  Toe Exam: ROM and strength within normal limits  Sensory   Vibration: intact  Proprioception: intact  Monofilament testing: intact    Vascular  Capillary refills: < 3 seconds  The right DP pulse is 2+  The right PT pulse is 2+  Left Foot/Ankle  Left Foot Inspection  Skin Exam: skin normal and skin intact  No dry skin, no warmth, no erythema, no maceration, normal color, no pre-ulcer, no ulcer and no callus  Toe Exam: ROM and strength within normal limits  Sensory   Vibration: intact  Proprioception: intact  Monofilament testing: intact    Vascular  Capillary refills: < 3 seconds  The left DP pulse is 2+  The left PT pulse is 2+       Assign Risk Category  No deformity present  No loss of protective sensation  No weak pulses  Risk: 0        Genesis Wilkins MD  [address] [place] [address]

## 2022-11-30 NOTE — PATIENT INSTRUCTIONS

## 2022-11-30 NOTE — ASSESSMENT & PLAN NOTE
Normal physical exam    Discussed colon cancer screening  Wants to try the cologuard first  No FH colon cancer  Discussed prostate cancer screening  He opts in for screening  Ordered today

## 2022-12-01 LAB
CREAT UR-MCNC: 209 MG/DL
MICROALBUMIN UR-MCNC: 11.6 MG/L (ref 0–20)
MICROALBUMIN/CREAT 24H UR: 6 MG/G CREATININE (ref 0–30)

## 2023-01-21 ENCOUNTER — APPOINTMENT (OUTPATIENT)
Dept: LAB | Facility: CLINIC | Age: 58
End: 2023-01-21

## 2023-01-21 DIAGNOSIS — Z13.0 SCREENING FOR DEFICIENCY ANEMIA: ICD-10-CM

## 2023-01-21 DIAGNOSIS — Z12.5 PROSTATE CANCER SCREENING: ICD-10-CM

## 2023-01-21 DIAGNOSIS — E11.9 TYPE 2 DIABETES MELLITUS WITHOUT COMPLICATION, WITHOUT LONG-TERM CURRENT USE OF INSULIN (HCC): ICD-10-CM

## 2023-01-21 LAB
ALBUMIN SERPL BCP-MCNC: 4.1 G/DL (ref 3.5–5)
ALP SERPL-CCNC: 94 U/L (ref 46–116)
ALT SERPL W P-5'-P-CCNC: 59 U/L (ref 12–78)
ANION GAP SERPL CALCULATED.3IONS-SCNC: 4 MMOL/L (ref 4–13)
AST SERPL W P-5'-P-CCNC: 28 U/L (ref 5–45)
BASOPHILS # BLD AUTO: 0.01 THOUSANDS/ÂΜL (ref 0–0.1)
BASOPHILS NFR BLD AUTO: 0 % (ref 0–1)
BILIRUB SERPL-MCNC: 0.78 MG/DL (ref 0.2–1)
BUN SERPL-MCNC: 23 MG/DL (ref 5–25)
CALCIUM SERPL-MCNC: 9.2 MG/DL (ref 8.3–10.1)
CHLORIDE SERPL-SCNC: 108 MMOL/L (ref 96–108)
CHOLEST SERPL-MCNC: 122 MG/DL
CO2 SERPL-SCNC: 27 MMOL/L (ref 21–32)
CREAT SERPL-MCNC: 0.78 MG/DL (ref 0.6–1.3)
EOSINOPHIL # BLD AUTO: 0.15 THOUSAND/ÂΜL (ref 0–0.61)
EOSINOPHIL NFR BLD AUTO: 4 % (ref 0–6)
ERYTHROCYTE [DISTWIDTH] IN BLOOD BY AUTOMATED COUNT: 12.2 % (ref 11.6–15.1)
EST. AVERAGE GLUCOSE BLD GHB EST-MCNC: 134 MG/DL
GFR SERPL CREATININE-BSD FRML MDRD: 101 ML/MIN/1.73SQ M
GLUCOSE P FAST SERPL-MCNC: 141 MG/DL (ref 65–99)
HBA1C MFR BLD: 6.3 %
HCT VFR BLD AUTO: 39.8 % (ref 36.5–49.3)
HDLC SERPL-MCNC: 33 MG/DL
HGB BLD-MCNC: 13.2 G/DL (ref 12–17)
IMM GRANULOCYTES # BLD AUTO: 0.01 THOUSAND/UL (ref 0–0.2)
IMM GRANULOCYTES NFR BLD AUTO: 0 % (ref 0–2)
LDLC SERPL CALC-MCNC: 58 MG/DL (ref 0–100)
LYMPHOCYTES # BLD AUTO: 1.47 THOUSANDS/ÂΜL (ref 0.6–4.47)
LYMPHOCYTES NFR BLD AUTO: 40 % (ref 14–44)
MCH RBC QN AUTO: 29.6 PG (ref 26.8–34.3)
MCHC RBC AUTO-ENTMCNC: 33.2 G/DL (ref 31.4–37.4)
MCV RBC AUTO: 89 FL (ref 82–98)
MONOCYTES # BLD AUTO: 0.33 THOUSAND/ÂΜL (ref 0.17–1.22)
MONOCYTES NFR BLD AUTO: 9 % (ref 4–12)
NEUTROPHILS # BLD AUTO: 1.69 THOUSANDS/ÂΜL (ref 1.85–7.62)
NEUTS SEG NFR BLD AUTO: 47 % (ref 43–75)
NONHDLC SERPL-MCNC: 89 MG/DL
NRBC BLD AUTO-RTO: 0 /100 WBCS
PLATELET # BLD AUTO: 216 THOUSANDS/UL (ref 149–390)
PMV BLD AUTO: 11.2 FL (ref 8.9–12.7)
POTASSIUM SERPL-SCNC: 4.5 MMOL/L (ref 3.5–5.3)
PROT SERPL-MCNC: 7.8 G/DL (ref 6.4–8.4)
PSA SERPL-MCNC: 0.8 NG/ML (ref 0–4)
RBC # BLD AUTO: 4.46 MILLION/UL (ref 3.88–5.62)
SODIUM SERPL-SCNC: 139 MMOL/L (ref 135–147)
TRIGL SERPL-MCNC: 153 MG/DL
WBC # BLD AUTO: 3.66 THOUSAND/UL (ref 4.31–10.16)

## 2023-11-04 DIAGNOSIS — R73.03 PREDIABETES: ICD-10-CM

## 2023-11-08 NOTE — PROGRESS NOTES
Assessment/Plan:    Type 2 diabetes mellitus without complication, without long-term current use of insulin ([facility] [address] )    Lab Results   Component Value Date    HGBA1C 5 8 10/13/2019     Patient is doing very well with diet control  Will continue current management  Patient encouraged to obtain his diabetic eye exam     Hyperlipidemia LDL goal <70  Patient has a slightly elevated triglyceride and low HDL level  Recommend taking fish oil OTC  Discussed that patient's LDL is elevated at 84, and due to diabetes his LDL goal is < 70  Will start treatment with rosuvastatin 5 mg daily  Plan to reassess with labs and follow-up in 6 months  Mass of left chest wall  Will refer to general surgery for removal of the chest wall mass  Diagnoses and all orders for this visit:    Type 2 diabetes mellitus without complication, without long-term current use of insulin ([facility])  -     HEMOGLOBIN A1C W/ EAG ESTIMATION; Future    Mass of left chest wall  -     Ambulatory referral to General Surgery; Future    Hyperlipidemia LDL goal <70  -     rosuvastatin (CRESTOR) 5 mg tablet; Take 1 tablet (5 mg total) by mouth daily  -     Lipid Panel with Direct LDL reflex; Future  -     Comprehensive metabolic panel; Future    Prostate cancer screening  -     PSA, total and free; Future    Need for vaccination against Streptococcus pneumoniae  -     PNEUMOCOCCAL POLYSACCHARIDE VACCINE 23-VALENT =>3YO SQ IM    Refused influenza vaccine          Subjective:      Patient ID: Jayson Crowder is a 48 y o  male  Diabetes   He presents for his follow-up diabetic visit  He has type 2 diabetes mellitus  His disease course has been improving  Pertinent negatives for hypoglycemia include no headaches  Pertinent negatives for diabetes include no chest pain  Risk factors for coronary artery disease include dyslipidemia, diabetes mellitus, male sex and obesity  Current diabetic treatment includes diet   Meal planning includes avoidance of The chest was prepped. The site was prepped with ChloraPrep and Betadine. The site was clipped. The patient was draped. concentrated sweets  An ACE inhibitor/angiotensin II receptor blocker is not being taken  Eye exam is not current  Hyperlipidemia   This is a new problem  This is a new diagnosis  Recent lipid tests were reviewed and are variable  Exacerbating diseases include diabetes and obesity  Pertinent negatives include no chest pain  He is currently on no antihyperlipidemic treatment  Risk factors for coronary artery disease include dyslipidemia, diabetes mellitus, male sex and obesity  The following portions of the patient's history were reviewed and updated as appropriate: allergies, current medications, past family history, past medical history, past social history, past surgical history and problem list     Review of Systems   Cardiovascular: Negative for chest pain  Neurological: Negative for headaches  Objective:      /76 (BP Location: Left arm, Patient Position: Sitting, Cuff Size: Standard)   Pulse 72   Temp 98 3 °F (36 8 °C) (Oral)   Resp 17   Ht 5' 10" (1 778 m)   Wt 98 4 kg (217 lb)   SpO2 98%   BMI 31 14 kg/m²          Physical Exam   Constitutional: He is oriented to person, place, and time  He appears well-developed and well-nourished  He is cooperative  HENT:   Head: Normocephalic and atraumatic  Right Ear: Hearing and external ear normal    Left Ear: Hearing and external ear normal    Eyes: Conjunctivae and lids are normal    Cardiovascular: Normal rate  Pulses are no weak pulses  Pulses:       Dorsalis pedis pulses are 2+ on the right side, and 2+ on the left side  Posterior tibial pulses are 2+ on the right side, and 2+ on the left side  Pulmonary/Chest: Effort normal    Musculoskeletal: Normal range of motion  Feet:   Right Foot:   Skin Integrity: Negative for ulcer, skin breakdown, erythema, warmth, callus or dry skin  Left Foot:   Skin Integrity: Negative for ulcer, skin breakdown, erythema, warmth, callus or dry skin     Neurological: He is alert and oriented to person, place, and time  Gait normal    Skin: Skin is warm and dry  Psychiatric: He has a normal mood and affect  His speech is normal and behavior is normal    Nursing note and vitals reviewed  Patient's shoes and socks removed  Right Foot/Ankle   Right Foot Inspection  Skin Exam: skin normal and skin intact no dry skin, no warmth, no callus, no erythema, no maceration, no abnormal color, no pre-ulcer, no ulcer and no callus                            Sensory       Monofilament testing: intact  Vascular  Capillary refills: < 3 seconds  The right DP pulse is 2+  The right PT pulse is 2+  Left Foot/Ankle  Left Foot Inspection  Skin Exam: skin normal and skin intactno dry skin, no warmth, no erythema, no maceration, normal color, no pre-ulcer, no ulcer and no callus                                         Sensory       Monofilament: intact  Vascular  Capillary refills: < 3 seconds  The left DP pulse is 2+  The left PT pulse is 2+  Assign Risk Category:  No deformity present; No loss of protective sensation;  No weak pulses       Risk: 0      Lab Results   Component Value Date    SODIUM 138 10/13/2019    K 4 2 10/13/2019     10/13/2019    CO2 26 10/13/2019    AGAP 4 10/13/2019    BUN 20 10/13/2019    CREATININE 0 84 10/13/2019    GLUC 107 08/21/2016    GLUF 130 (H) 10/13/2019    CALCIUM 9 0 10/13/2019    AST 23 10/13/2019    ALT 52 10/13/2019    ALKPHOS 86 10/13/2019    TP 7 9 10/13/2019    TBILI 0 84 10/13/2019    EGFR 101 10/13/2019     Lab Results   Component Value Date    CHOLESTEROL 153 10/13/2019    CHOLESTEROL 147 03/23/2019    CHOLESTEROL 147 03/04/2018     Lab Results   Component Value Date    HDL 38 (L) 10/13/2019    HDL 30 (L) 03/23/2019    HDL 38 (L) 03/04/2018     Lab Results   Component Value Date    TRIG 157 (H) 10/13/2019    TRIG 185 (H) 03/23/2019    TRIG 107 03/04/2018     No results found for: [facility]  Lab Results   Component Value Date    LDLCALC 84 10/13/2019 Lab Results   Component Value Date    HGBA1C 5 8 10/13/2019     Lab Results   Component Value Date    WBC 3 11 (L) 10/13/2019    HGB 14 4 10/13/2019    HCT 42 3 10/13/2019    MCV 90 10/13/2019     10/13/2019

## 2024-05-13 NOTE — ANESTHESIA POSTPROCEDURE EVALUATION
Post-Op Assessment Note    CV Status:  Stable  Pain Score: 0    Pain management: adequate     Mental Status:  Alert and awake   Hydration Status:  Euvolemic   PONV Controlled:  Controlled   Airway Patency:  Patent      Post Op Vitals Reviewed: Yes      Staff: CRNA         No complications documented      BP   103/77   Temp  97 0   Pulse  60   Resp   15   SpO2   100 97.6

## 2024-07-08 DIAGNOSIS — E78.5 HYPERLIPIDEMIA LDL GOAL <70: ICD-10-CM

## 2024-07-09 RX ORDER — ROSUVASTATIN CALCIUM 5 MG/1
5 TABLET, COATED ORAL DAILY
Qty: 90 TABLET | Refills: 1 | Status: SHIPPED | OUTPATIENT
Start: 2024-07-09

## 2025-02-06 DIAGNOSIS — R73.03 PREDIABETES: ICD-10-CM

## 2025-02-07 NOTE — TELEPHONE ENCOUNTER
Please of the patient know that I am only giving him 1 month supply of his medication.  He has not been seen since 2022.  He needs an appointment.  Thank you

## 2025-02-28 DIAGNOSIS — R73.03 PREDIABETES: ICD-10-CM

## 2025-03-18 ENCOUNTER — OFFICE VISIT (OUTPATIENT)
Dept: FAMILY MEDICINE CLINIC | Facility: CLINIC | Age: 60
End: 2025-03-18
Payer: COMMERCIAL

## 2025-03-18 VITALS
WEIGHT: 225.3 LBS | HEART RATE: 79 BPM | TEMPERATURE: 98 F | RESPIRATION RATE: 18 BRPM | DIASTOLIC BLOOD PRESSURE: 83 MMHG | OXYGEN SATURATION: 98 % | SYSTOLIC BLOOD PRESSURE: 129 MMHG | BODY MASS INDEX: 30.51 KG/M2 | HEIGHT: 72 IN

## 2025-03-18 DIAGNOSIS — Z23 NEED FOR VACCINATION: ICD-10-CM

## 2025-03-18 DIAGNOSIS — E66.9 OBESITY (BMI 30-39.9): ICD-10-CM

## 2025-03-18 DIAGNOSIS — D22.9 NUMEROUS SKIN MOLES: ICD-10-CM

## 2025-03-18 DIAGNOSIS — E78.2 MIXED HYPERLIPIDEMIA: ICD-10-CM

## 2025-03-18 DIAGNOSIS — D72.819 LEUKOPENIA, UNSPECIFIED TYPE: ICD-10-CM

## 2025-03-18 DIAGNOSIS — E11.9 TYPE 2 DIABETES MELLITUS WITHOUT COMPLICATION, WITHOUT LONG-TERM CURRENT USE OF INSULIN (HCC): ICD-10-CM

## 2025-03-18 DIAGNOSIS — Z11.4 SCREENING FOR HIV (HUMAN IMMUNODEFICIENCY VIRUS): ICD-10-CM

## 2025-03-18 DIAGNOSIS — Z00.00 ANNUAL PHYSICAL EXAM: Primary | ICD-10-CM

## 2025-03-18 DIAGNOSIS — Z12.5 SCREENING FOR PROSTATE CANCER: ICD-10-CM

## 2025-03-18 DIAGNOSIS — Z11.59 NEED FOR HEPATITIS C SCREENING TEST: ICD-10-CM

## 2025-03-18 DIAGNOSIS — Z12.11 SCREEN FOR COLON CANCER: ICD-10-CM

## 2025-03-18 PROCEDURE — 99396 PREV VISIT EST AGE 40-64: CPT | Performed by: FAMILY MEDICINE

## 2025-03-18 PROCEDURE — 99214 OFFICE O/P EST MOD 30 MIN: CPT | Performed by: FAMILY MEDICINE

## 2025-03-18 RX ORDER — ROSUVASTATIN CALCIUM 5 MG/1
5 TABLET, COATED ORAL DAILY
Qty: 100 TABLET | Refills: 3 | Status: SHIPPED | OUTPATIENT
Start: 2025-03-18

## 2025-03-18 RX ORDER — ZOSTER VACCINE RECOMBINANT, ADJUVANTED 50 MCG/0.5
0.5 KIT INTRAMUSCULAR ONCE
Qty: 1 EACH | Refills: 1 | Status: SHIPPED | OUTPATIENT
Start: 2025-03-18 | End: 2025-03-18

## 2025-03-18 NOTE — ASSESSMENT & PLAN NOTE
Obesity with a BMI of 30.56.  Advised patient to lose weight with a better diet and exercise.  Continue to monitor and follow-up.  Check labs above.

## 2025-03-18 NOTE — ASSESSMENT & PLAN NOTE
Not new.  Mild.  Asymptomatic.  Discussed with patient.  Continue to monitor.  Check labs.  Orders:  •  CBC and differential; Future

## 2025-03-18 NOTE — PROGRESS NOTES
Adult Annual Physical  Name: Kelton Handy      : 1965      MRN: [id number]  Encounter Provider: Tika Vu MD  Encounter Date: 3/18/2025   Encounter department: [facility]    Assessment & Plan  Annual physical exam  Regarding his annual physical patient is given age and diagnosis appropriate evaluation and care.  Patient is ordered the blood work and urine below which also includes a screening test for prostate cancer as well as HIV and hepatitis C as discussed with patient and with his permission.  Patient declined the flu vaccine.  He is sent the Shingrix vaccine to his pharmacy.  He is up-to-date with his Tdap vaccine.  Patient sent to GI for his colonoscopy.  Patient advised to lose weight with a better diet and exercise.  Take a multivitamin.  Take vitamin D3 2000 units daily.  Routine self skin checks and sunscreen.  Orders:  •  Lipid Panel with Direct LDL reflex; Future  •  CBC and differential; Future  •  Comprehensive metabolic panel; Future  •  TSH, 3rd generation with Free T4 reflex; Future  •  UA w Reflex to Microscopic w Reflex to Culture; Future    Type 2 diabetes mellitus without complication, without long-term current use of insulin ([facility])    Lab Results   Component Value Date    HGBA1C 6.3 (H) 2023   Controlled.  Discussed with patient.  Patient's metformin refilled as per his request which is 1 tablet once daily with breakfast.  Will check his labs.  Patient seen ophthalmology already and asserts that he has no diabetic retinopathy bilateral.  Patient's foot exam was done by me today and is advised to check his feet daily himself.  And not to cut his toenails too deep.  Advised patient to lose weight with a better diet and exercise.  Cut down on his starches sweets and fats.  Also discussed with patient the use of an ACE or an ARB in a diabetic patient.  Will check labs and discussed with patient again at his next visit.  Orders:  •   Comprehensive metabolic panel; Future  •  Hemoglobin A1C; Future  •  Albumin / creatinine urine ratio; Future  •  metFORMIN (GLUCOPHAGE) 500 mg tablet; Take 1 tablet (500 mg total) by mouth daily with breakfast    Mixed hyperlipidemia  Controlled.  Advised patient to have less fats starches and sweets.  Lose weight with a better diet and exercise with his statin is refilled.  Will check his labs.  Orders:  •  Lipid Panel with Direct LDL reflex; Future  •  Comprehensive metabolic panel; Future  •  rosuvastatin (CRESTOR) 5 mg tablet; Take 1 tablet (5 mg total) by mouth daily    Leukopenia, unspecified type  Not new.  Mild.  Asymptomatic.  Discussed with patient.  Continue to monitor.  Check labs.  Orders:  •  CBC and differential; Future    Numerous skin moles  Discussed with patient.  Patient occasion.  Patient's vigilance.  Patient given referral to dermatology also.  Orders:  •  Ambulatory Referral to Dermatology; Future    Obesity (BMI 30-39.9)  Obesity with a BMI of 30.56.  Advised patient to lose weight with a better diet and exercise.  Continue to monitor and follow-up.  Check labs above.       Screen for colon cancer  Discussed with patient.  And patient sent to GI for colonoscopy.  Orders:  •  Ambulatory referral to Gastroenterology; Future    Screening for prostate cancer  Discussed with patient.  Orders:  •  PSA, total and free; Future    Screening for HIV (human immunodeficiency virus)  Discussed with patient.  Orders:  •  HIV 1/2 AB/AG w Reflex [facility] for 2 yr old and above; Future    Need for hepatitis C screening test  Discussed with patient.  Orders:  •  Hepatitis C antibody; Future    Need for vaccination  Discussed with patient.  Orders:  •  Zoster Vac Recomb Adjuvanted (Shingrix) 50 MCG/0.5ML SUSR; Inject 0.5 mL into a muscle once for 1 dose Repeat dose in 2 to 6 months        RTO 1 month and do the blood work and urine before.          Preventive Screenings:  - Diabetes Screening: screening not  indicated and has diabetes  - Cholesterol Screening: screening not indicated and has hyperlipidemia   - Hepatitis C screening: screening up-to-date   - HIV screening: risks/benefits discussed and patient agrees to screening   - Colon cancer screening: risks/benefits discussed and orders placed   - Lung cancer screening: screening not indicated   - Prostate cancer screening: risks/benefits discussed and orders placed     Immunizations:  - Immunizations due: Influenza, Zoster (Shingrix) and Patient declined the annual flu vaccine but is amenable to getting the shingles vaccine, and is sent to his pharmacy.  - Risks/benefits immunizations discussed    - The patient declines recommended vaccines currently despite my recommendations      Counseling/Anticipatory Guidance:  - Alcohol: discussed moderation in alcohol intake and recommendations for healthy alcohol use.   - Drug use: discussed harms of illicit drug use and how it can negatively impact mental/physical health.   - Tobacco use: discussed harms of tobacco use and management options for quitting.   - Dental health: discussed importance of regular tooth brushing, flossing, and dental visits.   - Sexual health: discussed sexually transmitted diseases, partner selection, use of condoms, avoidance of unintended pregnancy, and contraceptive alternatives.   - Diet: discussed recommendations for a healthy/well-balanced diet.   - Exercise: the importance of regular exercise/physical activity was discussed. Recommend exercise 3-5 times per week for at least 30 minutes.   - Injury prevention: discussed safety/seat belts, safety helmets, smoke detectors, carbon monoxide detectors, and smoking near bedding or upholstery.       Depression Screening and Follow-up Plan: Patient was screened for depression during today's encounter. They screened negative with a PHQ-2 score of 0.          History of Present Illness     Adult Annual Physical:  Patient presents for annual physical.  59-year-old male, new patient to me but not the practice, here to establish care with me for and for his annual physical.  Patient would also like to be evaluated for his diabetes and hyperlipidemia.  Patient has not done recent blood work or urine for this.  Patient is requesting a refill on both of his meds.  Patient takes metformin 1 pill just once in the morning with breakfast though is prescribed twice daily.  Patient saw his ophthalmologist recently and was told that he has no diabetic retinopathy bilateral.  Patient is due for his diabetic foot exam today.  Patient has not had a colonoscopy yet.  Patient denies tobacco.  Patient declined the flu vaccine but is amenable to getting the shingles vaccine.  No history of an adverse reaction to vaccines in the past..     Diet and Physical Activity:  - Diet/Nutrition: well balanced diet.  - Exercise: walking and no formal exercise.    Depression Screening:  - PHQ-2 Score: 0    General Health:  - Sleep: sleeps well.  - Hearing: normal hearing bilateral ears.  - Vision: no vision problems and wears glasses.  - Dental: regular dental visits.     Health:  - History of STDs: no.   - Urinary symptoms: none.     Advanced Care Planning:  - Has an advanced directive?: no    - Has a durable medical POA?: no      Review of Systems   Constitutional:  Negative for activity change, appetite change, chills, fatigue, fever and unexpected weight change.   HENT:  Negative for congestion, hearing loss and sore throat.    Eyes:  Negative for visual disturbance.   Respiratory:  Negative for cough and shortness of breath.    Cardiovascular:  Negative for chest pain and palpitations.   Gastrointestinal:  Negative for abdominal pain, blood in stool, constipation, diarrhea, nausea and vomiting.   Genitourinary:  Negative for dysuria and hematuria.   Musculoskeletal:  Negative for arthralgias.   Skin:  Negative for rash.   Neurological:  Negative for dizziness and headaches.    Psychiatric/Behavioral:  Negative for dysphoric mood. The patient is not nervous/anxious.          Objective   /83 (BP Location: Left arm, Patient Position: Sitting, Cuff Size: Adult)   Pulse 79   Temp 98 °F (36.7 °C) (Temporal)   Resp 18   Ht 6' (1.829 m)   Wt 102 kg (225 lb 4.8 oz)   SpO2 98%   BMI 30.56 kg/m²     Physical Exam  Vitals reviewed.   Constitutional:       General: He is not in acute distress.     Appearance: Normal appearance. He is obese. He is not ill-appearing.   HENT:      Head: Normocephalic and atraumatic.      Right Ear: Tympanic membrane, ear canal and external ear normal.      Left Ear: Tympanic membrane, ear canal and external ear normal.      Mouth/Throat:      Mouth: Mucous membranes are moist.      Pharynx: Oropharynx is clear.   Eyes:      Extraocular Movements: Extraocular movements intact.      Conjunctiva/sclera: Conjunctivae normal.   Neck:      Vascular: No carotid bruit.   Cardiovascular:      Rate and Rhythm: Normal rate and regular rhythm.      Pulses: no weak pulses.           Dorsalis pedis pulses are 2+ on the right side and 2+ on the left side.        Posterior tibial pulses are 2+ on the right side and 2+ on the left side.   Pulmonary:      Effort: Pulmonary effort is normal.      Breath sounds: Normal breath sounds.   Abdominal:      General: Bowel sounds are normal.      Palpations: Abdomen is soft.      Tenderness: There is no abdominal tenderness. There is no right CVA tenderness or left CVA tenderness.   Musculoskeletal:         General: No tenderness.      Right lower leg: No edema.      Left lower leg: No edema.      Comments: No calf tenderness bilateral.   Feet:      Right foot:      Skin integrity: No ulcer, skin breakdown, erythema, warmth, callus or dry skin.      Left foot:      Skin integrity: No ulcer, skin breakdown, erythema, warmth, callus or dry skin.   Lymphadenopathy:      Cervical: No cervical adenopathy.   Skin:     General: Skin is  warm.      Comments: Skin moles on nervous parts of his body.  1 on his right upper cheek.  Another on his left lower leg.  Another on his right anterior upper flank/lower chest.   Neurological:      General: No focal deficit present.      Mental Status: He is alert and oriented to person, place, and time.   Psychiatric:         Mood and Affect: Mood normal.         Behavior: Behavior normal.         Thought Content: Thought content normal.           Diabetic Foot Exam    Patient's shoes and socks removed.    Right Foot/Ankle   Right Foot Inspection  Skin Exam: skin normal and skin intact. No dry skin, no warmth, no callus, no erythema, no maceration, no abnormal color, no pre-ulcer, no ulcer and no callus.     Toe Exam: ROM and strength within normal limits. No swelling, no tenderness, erythema and  no right toe deformity    Sensory   Monofilament testing: intact    Vascular  Capillary refills: < 3 seconds  The right DP pulse is 2+. The right PT pulse is 2+.     Left Foot/Ankle  Left Foot Inspection  Skin Exam: skin normal and skin intact. No dry skin, no warmth, no erythema, no maceration, normal color, no pre-ulcer, no ulcer and no callus.     Toe Exam: ROM and strength within normal limits. No swelling, no tenderness, no erythema and no left toe deformity.     Sensory   Monofilament testing: intact    Vascular  Capillary refills: < 3 seconds  The left DP pulse is 2+. The left PT pulse is 2+.     Assign Risk Category  No deformity present  No loss of protective sensation  No weak pulses  Risk: 0

## 2025-03-18 NOTE — PATIENT INSTRUCTIONS
"Patient Education     Routine physical for adults   The Basics   Written by the doctors and editors at [facility]   What is a physical? -- A physical is a routine visit, or \"check-up,\" with your doctor. You might also hear it called a \"wellness visit\" or \"preventive visit.\"  During each visit, the doctor will:   Ask about your physical and mental health   Ask about your habits, behaviors, and lifestyle   Do an exam   Give you vaccines if needed   Talk to you about any medicines you take   Give advice about your health   Answer your questions  Getting regular check-ups is an important part of taking care of your health. It can help your doctor find and treat any problems you have. But it's also important for preventing health problems.  A routine physical is different from a \"sick visit.\" A sick visit is when you see a doctor because of a health concern or problem. Since physicals are scheduled ahead of time, you can think about what you want to ask the doctor.  How often should I get a physical? -- It depends on your age and health. In general, for people age 21 years and older:   If you are younger than 50 years, you might be able to get a physical every 3 years.   If you are 50 years or older, your doctor might recommend a physical every year.  If you have an ongoing health condition, like diabetes or high blood pressure, your doctor will probably want to see you more often.  What happens during a physical? -- In general, each visit will include:   Physical exam - The doctor or nurse will check your height, weight, heart rate, and blood pressure. They will also look at your eyes and ears. They will ask about how you are feeling and whether you have any symptoms that bother you.   Medicines - It's a good idea to bring a list of all the medicines you take to each doctor visit. Your doctor will talk to you about your medicines and answer any questions. Tell them if you are having any side effects that bother you. You " "should also tell them if you are having trouble paying for any of your medicines.   Habits and behaviors - This includes:   Your diet   Your exercise habits   Whether you smoke, drink alcohol, or use drugs   Whether you are sexually active   Whether you feel safe at home  Your doctor will talk to you about things you can do to improve your health and lower your risk of health problems. They will also offer help and support. For example, if you want to quit smoking, they can give you advice and might prescribe medicines. If you want to improve your diet or get more physical activity, they can help you with this, too.   Lab tests, if needed - The tests you get will depend on your age and situation. For example, your doctor might want to check your:   Cholesterol   Blood sugar   Iron level   Vaccines - The recommended vaccines will depend on your age, health, and what vaccines you already had. Vaccines are very important because they can prevent certain serious or deadly infections.   Discussion of screening - \"Screening\" means checking for diseases or other health problems before they cause symptoms. Your doctor can recommend screening based on your age, risk, and preferences. This might include tests to check for:   Cancer, such as breast, prostate, cervical, ovarian, colorectal, prostate, lung, or skin cancer   Sexually transmitted infections, such as chlamydia and gonorrhea   Mental health conditions like depression and anxiety  Your doctor will talk to you about the different types of screening tests. They can help you decide which screenings to have. They can also explain what the results might mean.   Answering questions - The physical is a good time to ask the doctor or nurse questions about your health. If needed, they can refer you to other doctors or specialists, too.  Adults older than 65 years often need other care, too. As you get older, your doctor will talk to you about:   How to prevent falling at " home   Hearing or vision tests   Memory testing   How to take your medicines safely   Making sure that you have the help and support you need at home  All topics are updated as new evidence becomes available and our peer review process is complete.  This topic retrieved from [employer] on: May 02, 2024.  Topic 272250 Version 1.0  Release: 32.4.3 - C32.122  © 2024 UpToDate, Inc. and/or its affiliates. All rights reserved.  Consumer Information Use and Disclaimer   Disclaimer: This generalized information is a limited summary of diagnosis, treatment, and/or medication information. It is not meant to be comprehensive and should be used as a tool to help the user understand and/or assess potential diagnostic and treatment options. It does NOT include all information about conditions, treatments, medications, side effects, or risks that may apply to a specific patient. It is not intended to be medical advice or a substitute for the medical advice, diagnosis, or treatment of a health care provider based on the health care provider's examination and assessment of a patient's specific and unique circumstances. Patients must speak with a health care provider for complete information about their health, medical questions, and treatment options, including any risks or benefits regarding use of medications. This information does not endorse any treatments or medications as safe, effective, or approved for treating a specific patient. UpToDate, Inc. and its affiliates disclaim any warranty or liability relating to this information or the use thereof.The use of this information is governed by the Terms of Use, available at https://www.[employer]alsuwer.com/en/know/clinical-effectiveness-terms. 2024© UpToDate, Inc. and its affiliates and/or licensors. All rights reserved.  Copyright   © 2024 UpToDate, Inc. and/or its affiliates. All rights reserved.

## 2025-03-18 NOTE — ASSESSMENT & PLAN NOTE
Lab Results   Component Value Date    HGBA1C 6.3 (H) 01/21/2023   Controlled.  Discussed with patient.  Patient's metformin refilled as per his request which is 1 tablet once daily with breakfast.  Will check his labs.  Patient seen ophthalmology already and asserts that he has no diabetic retinopathy bilateral.  Patient's foot exam was done by me today and is advised to check his feet daily himself.  And not to cut his toenails too deep.  Advised patient to lose weight with a better diet and exercise.  Cut down on his starches sweets and fats.  Also discussed with patient the use of an ACE or an ARB in a diabetic patient.  Will check labs and discussed with patient again at his next visit.  Orders:  •  Comprehensive metabolic panel; Future  •  Hemoglobin A1C; Future  •  Albumin / creatinine urine ratio; Future  •  metFORMIN (GLUCOPHAGE) 500 mg tablet; Take 1 tablet (500 mg total) by mouth daily with breakfast

## 2025-03-18 NOTE — ASSESSMENT & PLAN NOTE
Regarding his annual physical patient is given age and diagnosis appropriate evaluation and care.  Patient is ordered the blood work and urine below which also includes a screening test for prostate cancer as well as HIV and hepatitis C as discussed with patient and with his permission.  Patient declined the flu vaccine.  He is sent the Shingrix vaccine to his pharmacy.  He is up-to-date with his Tdap vaccine.  Patient sent to GI for his colonoscopy.  Patient advised to lose weight with a better diet and exercise.  Take a multivitamin.  Take vitamin D3 2000 units daily.  Routine self skin checks and sunscreen.  Orders:  •  Lipid Panel with Direct LDL reflex; Future  •  CBC and differential; Future  •  Comprehensive metabolic panel; Future  •  TSH, 3rd generation with Free T4 reflex; Future  •  UA w Reflex to Microscopic w Reflex to Culture; Future

## 2025-03-18 NOTE — ASSESSMENT & PLAN NOTE
Controlled.  Advised patient to have less fats starches and sweets.  Lose weight with a better diet and exercise with his statin is refilled.  Will check his labs.  Orders:  •  Lipid Panel with Direct LDL reflex; Future  •  Comprehensive metabolic panel; Future  •  rosuvastatin (CRESTOR) 5 mg tablet; Take 1 tablet (5 mg total) by mouth daily

## 2025-04-12 ENCOUNTER — APPOINTMENT (OUTPATIENT)
Dept: LAB | Facility: CLINIC | Age: 60
End: 2025-04-12
Attending: FAMILY MEDICINE
Payer: COMMERCIAL

## 2025-04-12 DIAGNOSIS — Z00.00 ANNUAL PHYSICAL EXAM: ICD-10-CM

## 2025-04-12 DIAGNOSIS — Z11.59 NEED FOR HEPATITIS C SCREENING TEST: ICD-10-CM

## 2025-04-12 DIAGNOSIS — E11.9 TYPE 2 DIABETES MELLITUS WITHOUT COMPLICATION, WITHOUT LONG-TERM CURRENT USE OF INSULIN (HCC): ICD-10-CM

## 2025-04-12 DIAGNOSIS — E78.2 MIXED HYPERLIPIDEMIA: ICD-10-CM

## 2025-04-12 DIAGNOSIS — Z12.5 SCREENING FOR PROSTATE CANCER: ICD-10-CM

## 2025-04-12 DIAGNOSIS — D72.819 LEUKOPENIA, UNSPECIFIED TYPE: ICD-10-CM

## 2025-04-12 DIAGNOSIS — Z11.4 SCREENING FOR HIV (HUMAN IMMUNODEFICIENCY VIRUS): ICD-10-CM

## 2025-04-12 LAB
ALBUMIN SERPL BCG-MCNC: 4.7 G/DL (ref 3.5–5)
ALP SERPL-CCNC: 98 U/L (ref 34–104)
ALT SERPL W P-5'-P-CCNC: 56 U/L (ref 7–52)
ANION GAP SERPL CALCULATED.3IONS-SCNC: 8 MMOL/L (ref 4–13)
AST SERPL W P-5'-P-CCNC: 31 U/L (ref 13–39)
BASOPHILS # BLD AUTO: 0.01 THOUSANDS/ÂΜL (ref 0–0.1)
BASOPHILS NFR BLD AUTO: 0 % (ref 0–1)
BILIRUB SERPL-MCNC: 0.75 MG/DL (ref 0.2–1)
BILIRUB UR QL STRIP: NEGATIVE
BUN SERPL-MCNC: 17 MG/DL (ref 5–25)
CALCIUM SERPL-MCNC: 9.6 MG/DL (ref 8.4–10.2)
CHLORIDE SERPL-SCNC: 104 MMOL/L (ref 96–108)
CHOLEST SERPL-MCNC: 119 MG/DL (ref ?–200)
CLARITY UR: CLEAR
CO2 SERPL-SCNC: 28 MMOL/L (ref 21–32)
COLOR UR: NORMAL
CREAT SERPL-MCNC: 0.85 MG/DL (ref 0.6–1.3)
CREAT UR-MCNC: 166.5 MG/DL
EOSINOPHIL # BLD AUTO: 0.26 THOUSAND/ÂΜL (ref 0–0.61)
EOSINOPHIL NFR BLD AUTO: 8 % (ref 0–6)
ERYTHROCYTE [DISTWIDTH] IN BLOOD BY AUTOMATED COUNT: 12.2 % (ref 11.6–15.1)
EST. AVERAGE GLUCOSE BLD GHB EST-MCNC: 163 MG/DL
GFR SERPL CREATININE-BSD FRML MDRD: 96 ML/MIN/1.73SQ M
GLUCOSE P FAST SERPL-MCNC: 191 MG/DL (ref 65–99)
GLUCOSE UR STRIP-MCNC: NEGATIVE MG/DL
HBA1C MFR BLD: 7.3 %
HCT VFR BLD AUTO: 42 % (ref 36.5–49.3)
HCV AB SER QL: NORMAL
HDLC SERPL-MCNC: 33 MG/DL
HGB BLD-MCNC: 14.1 G/DL (ref 12–17)
HGB UR QL STRIP.AUTO: NEGATIVE
HIV 1+2 AB+HIV1 P24 AG SERPL QL IA: NORMAL
IMM GRANULOCYTES # BLD AUTO: 0.01 THOUSAND/UL (ref 0–0.2)
IMM GRANULOCYTES NFR BLD AUTO: 0 % (ref 0–2)
KETONES UR STRIP-MCNC: NEGATIVE MG/DL
LDLC SERPL CALC-MCNC: 43 MG/DL (ref 0–100)
LEUKOCYTE ESTERASE UR QL STRIP: NEGATIVE
LYMPHOCYTES # BLD AUTO: 1.39 THOUSANDS/ÂΜL (ref 0.6–4.47)
LYMPHOCYTES NFR BLD AUTO: 40 % (ref 14–44)
MCH RBC QN AUTO: 30.2 PG (ref 26.8–34.3)
MCHC RBC AUTO-ENTMCNC: 33.6 G/DL (ref 31.4–37.4)
MCV RBC AUTO: 90 FL (ref 82–98)
MICROALBUMIN UR-MCNC: 11.8 MG/L
MICROALBUMIN/CREAT 24H UR: 7 MG/G CREATININE (ref 0–30)
MONOCYTES # BLD AUTO: 0.31 THOUSAND/ÂΜL (ref 0.17–1.22)
MONOCYTES NFR BLD AUTO: 9 % (ref 4–12)
NEUTROPHILS # BLD AUTO: 1.48 THOUSANDS/ÂΜL (ref 1.85–7.62)
NEUTS SEG NFR BLD AUTO: 43 % (ref 43–75)
NITRITE UR QL STRIP: NEGATIVE
NRBC BLD AUTO-RTO: 0 /100 WBCS
PH UR STRIP.AUTO: 6 [PH]
PLATELET # BLD AUTO: 210 THOUSANDS/UL (ref 149–390)
PMV BLD AUTO: 12.1 FL (ref 8.9–12.7)
POTASSIUM SERPL-SCNC: 5 MMOL/L (ref 3.5–5.3)
PROT SERPL-MCNC: 7.6 G/DL (ref 6.4–8.4)
PROT UR STRIP-MCNC: NEGATIVE MG/DL
PSA FREE MFR SERPL: 14.46 %
PSA FREE SERPL-MCNC: 0.1 NG/ML
PSA SERPL-MCNC: 0.73 NG/ML (ref 0–4)
RBC # BLD AUTO: 4.67 MILLION/UL (ref 3.88–5.62)
SODIUM SERPL-SCNC: 140 MMOL/L (ref 135–147)
SP GR UR STRIP.AUTO: 1.02 (ref 1–1.03)
TRIGL SERPL-MCNC: 215 MG/DL (ref ?–150)
TSH SERPL DL<=0.05 MIU/L-ACNC: 1.16 UIU/ML (ref 0.45–4.5)
UROBILINOGEN UR STRIP-ACNC: <2 MG/DL
WBC # BLD AUTO: 3.46 THOUSAND/UL (ref 4.31–10.16)

## 2025-04-12 PROCEDURE — 84443 ASSAY THYROID STIM HORMONE: CPT

## 2025-04-12 PROCEDURE — 87389 HIV-1 AG W/HIV-1&-2 AB AG IA: CPT

## 2025-04-12 PROCEDURE — 82570 ASSAY OF URINE CREATININE: CPT

## 2025-04-12 PROCEDURE — 86803 HEPATITIS C AB TEST: CPT

## 2025-04-12 PROCEDURE — 84154 ASSAY OF PSA FREE: CPT

## 2025-04-12 PROCEDURE — 80053 COMPREHEN METABOLIC PANEL: CPT

## 2025-04-12 PROCEDURE — 85025 COMPLETE CBC W/AUTO DIFF WBC: CPT

## 2025-04-12 PROCEDURE — 36415 COLL VENOUS BLD VENIPUNCTURE: CPT

## 2025-04-12 PROCEDURE — 80061 LIPID PANEL: CPT

## 2025-04-12 PROCEDURE — 82043 UR ALBUMIN QUANTITATIVE: CPT

## 2025-04-12 PROCEDURE — 84153 ASSAY OF PSA TOTAL: CPT

## 2025-04-12 PROCEDURE — 81003 URINALYSIS AUTO W/O SCOPE: CPT

## 2025-04-12 PROCEDURE — 83036 HEMOGLOBIN GLYCOSYLATED A1C: CPT

## 2025-04-16 ENCOUNTER — RESULTS FOLLOW-UP (OUTPATIENT)
Dept: FAMILY MEDICINE CLINIC | Facility: CLINIC | Age: 60
End: 2025-04-16

## 2025-04-16 NOTE — RESULT ENCOUNTER NOTE
Please call the patient regarding his abnormal result.  A1c, glucose and triglycerides all 3 elevated.  Will discuss with patient at his coming appointment on 4/18/2025 with me also.  Meanwhile, please cut down on the starches sweets and fats. ty

## 2025-04-18 ENCOUNTER — OFFICE VISIT (OUTPATIENT)
Dept: FAMILY MEDICINE CLINIC | Facility: CLINIC | Age: 60
End: 2025-04-18
Payer: COMMERCIAL

## 2025-04-18 VITALS
TEMPERATURE: 98 F | RESPIRATION RATE: 16 BRPM | DIASTOLIC BLOOD PRESSURE: 80 MMHG | BODY MASS INDEX: 29.61 KG/M2 | HEIGHT: 72 IN | OXYGEN SATURATION: 98 % | HEART RATE: 80 BPM | WEIGHT: 218.6 LBS | SYSTOLIC BLOOD PRESSURE: 110 MMHG

## 2025-04-18 DIAGNOSIS — E78.2 MIXED HYPERLIPIDEMIA: ICD-10-CM

## 2025-04-18 DIAGNOSIS — Z12.11 SCREENING FOR COLON CANCER: ICD-10-CM

## 2025-04-18 DIAGNOSIS — E11.9 TYPE 2 DIABETES MELLITUS WITHOUT COMPLICATION, WITHOUT LONG-TERM CURRENT USE OF INSULIN (HCC): Primary | ICD-10-CM

## 2025-04-18 DIAGNOSIS — E66.3 OVERWEIGHT WITH BODY MASS INDEX (BMI) OF 29 TO 29.9 IN ADULT: ICD-10-CM

## 2025-04-18 DIAGNOSIS — Z23 NEED FOR VACCINATION: ICD-10-CM

## 2025-04-18 DIAGNOSIS — D72.819 LEUKOPENIA, UNSPECIFIED TYPE: ICD-10-CM

## 2025-04-18 PROCEDURE — 99214 OFFICE O/P EST MOD 30 MIN: CPT | Performed by: FAMILY MEDICINE

## 2025-04-18 RX ORDER — ZOSTER VACCINE RECOMBINANT, ADJUVANTED 50 MCG/0.5
0.5 KIT INTRAMUSCULAR ONCE
Qty: 1 EACH | Refills: 1 | Status: SHIPPED | OUTPATIENT
Start: 2025-04-18 | End: 2025-04-18

## 2025-04-18 RX ORDER — METFORMIN HYDROCHLORIDE 500 MG/1
500 TABLET, EXTENDED RELEASE ORAL 2 TIMES DAILY WITH MEALS
Qty: 200 TABLET | Refills: 3 | Status: SHIPPED | OUTPATIENT
Start: 2025-04-18

## 2025-04-18 NOTE — ASSESSMENT & PLAN NOTE
Overweight with a BMI of 29.65.  Advised patient to lose weight with better diet and exercise.  Patient does admit to eating poorly.  Cut down/DC starches.  Sweets and fats also decreased.  Continue to monitor and follow-up.

## 2025-04-18 NOTE — ASSESSMENT & PLAN NOTE
Not controlled in terms of his triglycerides.  Though his total cholesterol and LDL are normal.  LFTs are normal except for his ALT of 56.  Patient without acute symptoms.  Discussed with patient.  Will recheck this in 3 months.  His total cholesterol is 119 but his triglycerides went up to 215 from 153 from January 2023, HDL 33 both times and his LDL went down to 43 from 58.  Advised patient to DC starches.  Lose weight with a better diet and exercise.  Cut down on fats also.  Continue current therapy.  And recheck labs again in 3 months.  Orders:  •  Comprehensive metabolic panel; Future  •  Lipid Panel with Direct LDL reflex; Future

## 2025-04-18 NOTE — ASSESSMENT & PLAN NOTE
Not new.  Asymptomatic.  His white cell now on 3.46 with absolute neutrophils 1.48.  Patient had symptoms are continue to monitor and follow-up.

## 2025-04-18 NOTE — ASSESSMENT & PLAN NOTE
Lab Results   Component Value Date    HGBA1C 7.3 (H) 04/12/2025   Not controlled.  And worse from his blood work from 2023.  His glucose now is 191 and his A1c went up to 7.3 now when it was 6.3 in January 2023.  Patient admits to taking his metformin only once a day.  Also admits to eating poor.  Discussed with patient.  Patient reeducation.  Patient advised to have less starches and sweets and fats.  Lose weight with a better diet and exercise.    Patient referred to diabetic education.  And patient's metformin is switched over to metformin 500 mg XR 1 tablet twice daily with meals.  Patient is sent to the ophthalmologist.  Is advised to continue checking his feet himself daily.  And recheck his labs below in 3 months.    Orders:  •  Ambulatory referral to Diabetic Education - use to refer for diabetes group classes, individual diabetes education, medical nutrition therapy, device training; Future  •  metFORMIN (GLUCOPHAGE-XR) 500 mg 24 hr tablet; Take 1 tablet (500 mg total) by mouth 2 (two) times a day with meals  •  Hemoglobin A1C; Future  •  Comprehensive metabolic panel; Future  •  Albumin / creatinine urine ratio; Future  •  Ambulatory referral to Ophthalmology; Future

## 2025-04-18 NOTE — PROGRESS NOTES
Name: Kelton Handy      : 1965      MRN: [id number]  Encounter Provider: Tika Vu MD  Encounter Date: 2025   Encounter department: [facility]  :  Assessment & Plan  Type 2 diabetes mellitus without complication, without long-term current use of insulin ([facility])    Lab Results   Component Value Date    HGBA1C 7.3 (H) 2025   Not controlled.  And worse from his blood work from .  His glucose now is 191 and his A1c went up to 7.3 now when it was 6.3 in 2023.  Patient admits to taking his metformin only once a day.  Also admits to eating poor.  Discussed with patient.  Patient reeducation.  Patient advised to have less starches and sweets and fats.  Lose weight with a better diet and exercise.    Patient referred to diabetic education.  And patient's metformin is switched over to metformin 500 mg XR 1 tablet twice daily with meals.  Patient is sent to the ophthalmologist.  Is advised to continue checking his feet himself daily.  And recheck his labs below in 3 months.    Orders:  •  Ambulatory referral to Diabetic Education - use to refer for diabetes group classes, individual diabetes education, medical nutrition therapy, device training; Future  •  metFORMIN (GLUCOPHAGE-XR) 500 mg 24 hr tablet; Take 1 tablet (500 mg total) by mouth 2 (two) times a day with meals  •  Hemoglobin A1C; Future  •  Comprehensive metabolic panel; Future  •  Albumin / creatinine urine ratio; Future  •  Ambulatory referral to Ophthalmology; Future    Mixed hyperlipidemia  Not controlled in terms of his triglycerides.  Though his total cholesterol and LDL are normal.  LFTs are normal except for his ALT of 56.  Patient without acute symptoms.  Discussed with patient.  Will recheck this in 3 months.  His total cholesterol is 119 but his triglycerides went up to 215 from 153 from 2023, HDL 33 both times and his LDL went down to 43 from 58.  Advised patient to HI  starches.  Lose weight with a better diet and exercise.  Cut down on fats also.  Continue current therapy.  And recheck labs again in 3 months.  Orders:  •  Comprehensive metabolic panel; Future  •  Lipid Panel with Direct LDL reflex; Future    Leukopenia, unspecified type  Not new.  Asymptomatic.  His white cell now on 3.46 with absolute neutrophils 1.48.  Patient had symptoms are continue to monitor and follow-up.       Overweight with body mass index (BMI) of 29 to 29.9 in adult  Overweight with a BMI of 29.65.  Advised patient to lose weight with better diet and exercise.  Patient does admit to eating poorly.  Cut down/DC starches.  Sweets and fats also decreased.  Continue to monitor and follow-up.       Screening for colon cancer  Discussed with patient.  Orders:  •  Ambulatory Referral to Gastroenterology; Future    Need for vaccination  Discussed with patient.  Orders:  •  Zoster Vac Recomb Adjuvanted (Shingrix) 50 MCG/0.5ML SUSR; Inject 0.5 mL into a muscle once for 1 dose Repeat dose in 2 to 6 months            RTO 3 months and do the blood work and urine before.  Patient can see me prior to that for anything else in between.         History of Present Illness   59-year-old male here for an evaluation of his diabetes and hyperlipidemia.  Patient did blood work and urine recently.  Patient denies tobacco.  Patient is due for shingles vaccine.  Patient is due to see his ophthalmologist for his diabetes.  And patient is due for his colonoscopy.      Review of Systems   Constitutional:  Negative for chills, fatigue, fever and unexpected weight change.   HENT:  Negative for congestion and sore throat.    Eyes:  Negative for visual disturbance.   Respiratory:  Negative for cough and shortness of breath.    Cardiovascular:  Negative for chest pain and palpitations.   Gastrointestinal:  Negative for abdominal pain and blood in stool.   Genitourinary:  Negative for dysuria and hematuria.   Musculoskeletal:   Negative for arthralgias.   Skin:  Negative for rash.   Neurological:  Negative for dizziness and headaches.   Psychiatric/Behavioral:  Negative for dysphoric mood. The patient is not nervous/anxious.        Objective   /80   Pulse 80   Temp 98 °F (36.7 °C) (Temporal)   Resp 16   Ht 6' (1.829 m)   Wt 99.2 kg (218 lb 9.6 oz)   SpO2 98%   BMI 29.65 kg/m²      Physical Exam  Vitals reviewed.   Constitutional:       General: He is not in acute distress.     Appearance: Normal appearance. He is not ill-appearing.   HENT:      Mouth/Throat:      Mouth: Mucous membranes are moist.   Neck:      Vascular: No carotid bruit.   Cardiovascular:      Rate and Rhythm: Normal rate and regular rhythm.   Pulmonary:      Effort: Pulmonary effort is normal.      Breath sounds: Normal breath sounds.   Musculoskeletal:      Right lower leg: No edema.      Left lower leg: No edema.      Comments: No calf tenderness bilateral.   Skin:     General: Skin is warm.   Neurological:      General: No focal deficit present.      Mental Status: He is alert and oriented to person, place, and time.   Psychiatric:         Mood and Affect: Mood normal.         Behavior: Behavior normal.         Thought Content: Thought content normal.

## 2025-04-24 ENCOUNTER — TELEPHONE (OUTPATIENT)
Age: 60
End: 2025-04-24

## 2025-04-24 DIAGNOSIS — E11.9 TYPE 2 DIABETES MELLITUS WITHOUT COMPLICATION, WITHOUT LONG-TERM CURRENT USE OF INSULIN (HCC): Primary | ICD-10-CM

## 2025-04-24 RX ORDER — LANCETS 33 GAUGE
EACH MISCELLANEOUS DAILY
Qty: 100 EACH | Refills: 3 | Status: SHIPPED | OUTPATIENT
Start: 2025-04-24

## 2025-04-24 RX ORDER — BLOOD-GLUCOSE METER
EACH MISCELLANEOUS DAILY
Qty: 1 KIT | Refills: 0 | Status: SHIPPED | OUTPATIENT
Start: 2025-04-24

## 2025-04-24 RX ORDER — UBIQUINOL 100 MG
CAPSULE ORAL
Qty: 100 EACH | Refills: 3 | Status: SHIPPED | OUTPATIENT
Start: 2025-04-24

## 2025-04-24 RX ORDER — BLOOD SUGAR DIAGNOSTIC
STRIP MISCELLANEOUS
Qty: 100 STRIP | Refills: 3 | Status: SHIPPED | OUTPATIENT
Start: 2025-04-24

## 2025-04-24 RX ORDER — LANCETS 30 GAUGE
EACH MISCELLANEOUS
Qty: 100 EACH | Refills: 4 | Status: SHIPPED | OUTPATIENT
Start: 2025-04-24

## 2025-04-24 NOTE — TELEPHONE ENCOUNTER
Patients daughter is calling to ask if provider can send a prescription for blood glucose monitor and supplies    She states that provider increased his Metformin and patient would like to check his sugar levels daily    Please review and advise

## 2025-05-06 DIAGNOSIS — E11.9 TYPE 2 DIABETES MELLITUS WITHOUT COMPLICATION, WITHOUT LONG-TERM CURRENT USE OF INSULIN (HCC): ICD-10-CM

## 2025-05-06 RX ORDER — BLOOD SUGAR DIAGNOSTIC
STRIP MISCELLANEOUS DAILY
Qty: 400 EACH | Refills: 3 | Status: SHIPPED | OUTPATIENT
Start: 2025-05-06

## 2025-05-06 NOTE — TELEPHONE ENCOUNTER
Reason for call:   [x] Refill   [] Prior Auth  [] Other:     Office:   [x] PCP/Provider -   [] Specialty/Provider -     Medication: (OneTouch Verio) test strip     Dose/Frequency:  Use daily,     Quantity: 100 strip    Pharmacy: [facility] - [place], [place]      Local Pharmacy   Does the patient have enough for 3 days?   [x] Yes   [] No - Send as HP to POD    Mail Away Pharmacy   Does the patient have enough for 10 days?   [] Yes   [] No - Send as HP to POD

## 2025-06-13 LAB
LEFT EYE DIABETIC RETINOPATHY: NORMAL
RIGHT EYE DIABETIC RETINOPATHY: NORMAL

## 2025-07-12 ENCOUNTER — APPOINTMENT (OUTPATIENT)
Dept: LAB | Facility: CLINIC | Age: 60
End: 2025-07-12
Attending: FAMILY MEDICINE
Payer: COMMERCIAL

## 2025-07-12 DIAGNOSIS — E11.9 TYPE 2 DIABETES MELLITUS WITHOUT COMPLICATION, WITHOUT LONG-TERM CURRENT USE OF INSULIN (HCC): ICD-10-CM

## 2025-07-12 DIAGNOSIS — E78.2 MIXED HYPERLIPIDEMIA: ICD-10-CM

## 2025-07-12 LAB
ALBUMIN SERPL BCG-MCNC: 4.3 G/DL (ref 3.5–5)
ALP SERPL-CCNC: 70 U/L (ref 34–104)
ALT SERPL W P-5'-P-CCNC: 35 U/L (ref 7–52)
ANION GAP SERPL CALCULATED.3IONS-SCNC: 6 MMOL/L (ref 4–13)
AST SERPL W P-5'-P-CCNC: 25 U/L (ref 13–39)
BILIRUB SERPL-MCNC: 0.64 MG/DL (ref 0.2–1)
BUN SERPL-MCNC: 17 MG/DL (ref 5–25)
CALCIUM SERPL-MCNC: 9.2 MG/DL (ref 8.4–10.2)
CHLORIDE SERPL-SCNC: 106 MMOL/L (ref 96–108)
CHOLEST SERPL-MCNC: 83 MG/DL (ref ?–200)
CO2 SERPL-SCNC: 29 MMOL/L (ref 21–32)
CREAT SERPL-MCNC: 0.77 MG/DL (ref 0.6–1.3)
CREAT UR-MCNC: 188.2 MG/DL
EST. AVERAGE GLUCOSE BLD GHB EST-MCNC: 128 MG/DL
GFR SERPL CREATININE-BSD FRML MDRD: 100 ML/MIN/1.73SQ M
GLUCOSE P FAST SERPL-MCNC: 121 MG/DL (ref 65–99)
HBA1C MFR BLD: 6.1 %
HDLC SERPL-MCNC: 33 MG/DL
LDLC SERPL CALC-MCNC: 33 MG/DL (ref 0–100)
MICROALBUMIN UR-MCNC: 11.9 MG/L
MICROALBUMIN/CREAT 24H UR: 6 MG/G CREATININE (ref 0–30)
POTASSIUM SERPL-SCNC: 4.7 MMOL/L (ref 3.5–5.3)
PROT SERPL-MCNC: 6.9 G/DL (ref 6.4–8.4)
SODIUM SERPL-SCNC: 141 MMOL/L (ref 135–147)
TRIGL SERPL-MCNC: 87 MG/DL (ref ?–150)

## 2025-07-12 PROCEDURE — 36415 COLL VENOUS BLD VENIPUNCTURE: CPT

## 2025-07-12 PROCEDURE — 80053 COMPREHEN METABOLIC PANEL: CPT

## 2025-07-12 PROCEDURE — 83036 HEMOGLOBIN GLYCOSYLATED A1C: CPT

## 2025-07-12 PROCEDURE — 80061 LIPID PANEL: CPT

## 2025-07-12 PROCEDURE — 82043 UR ALBUMIN QUANTITATIVE: CPT

## 2025-07-12 PROCEDURE — 82570 ASSAY OF URINE CREATININE: CPT

## 2025-07-25 ENCOUNTER — OFFICE VISIT (OUTPATIENT)
Dept: FAMILY MEDICINE CLINIC | Facility: CLINIC | Age: 60
End: 2025-07-25
Payer: COMMERCIAL

## 2025-07-25 VITALS
HEIGHT: 72 IN | RESPIRATION RATE: 16 BRPM | HEART RATE: 72 BPM | OXYGEN SATURATION: 100 % | TEMPERATURE: 97.6 F | BODY MASS INDEX: 27.12 KG/M2 | WEIGHT: 200.2 LBS | DIASTOLIC BLOOD PRESSURE: 69 MMHG | SYSTOLIC BLOOD PRESSURE: 115 MMHG

## 2025-07-25 DIAGNOSIS — Z23 NEED FOR VACCINATION: ICD-10-CM

## 2025-07-25 DIAGNOSIS — E11.9 TYPE 2 DIABETES MELLITUS WITHOUT COMPLICATION, WITHOUT LONG-TERM CURRENT USE OF INSULIN (HCC): Primary | ICD-10-CM

## 2025-07-25 DIAGNOSIS — Z12.11 SCREENING FOR COLON CANCER: ICD-10-CM

## 2025-07-25 DIAGNOSIS — E66.3 OVERWEIGHT WITH BODY MASS INDEX (BMI) OF 27 TO 27.9 IN ADULT: ICD-10-CM

## 2025-07-25 DIAGNOSIS — E78.2 MIXED HYPERLIPIDEMIA: ICD-10-CM

## 2025-07-25 PROCEDURE — 99214 OFFICE O/P EST MOD 30 MIN: CPT | Performed by: FAMILY MEDICINE

## 2025-07-25 RX ORDER — ZOSTER VACCINE RECOMBINANT, ADJUVANTED 50 MCG/0.5
0.5 KIT INTRAMUSCULAR ONCE
Qty: 1 EACH | Refills: 1 | Status: SHIPPED | OUTPATIENT
Start: 2025-07-25 | End: 2025-07-25

## 2025-07-25 NOTE — ASSESSMENT & PLAN NOTE
Lab Results   Component Value Date    HGBA1C 6.1 (H) 07/12/2025   Very much improved.  His glucose went down to 121 now when it was 191 and his A1c went down to 6.1 now when it was 7.3 in April of this year and 6.3 in January 2023.  Patient asserts that he has improved his diet and is exercising as well.  And is losing weight intentionally.  Patient has seen ophthalmology and said that he does not have any diabetic retinopathy.  He will continue his current treatment plan.  Will recheck his labs again in 4 months.  If his A1c is still 6.1 or below, to consider cutting down his medication to just 1 pill once per day of metformin  mg.  Patient agrees with the plan and will do.    Orders:  •  Hemoglobin A1C; Future  •  Comprehensive metabolic panel; Future

## 2025-07-25 NOTE — ASSESSMENT & PLAN NOTE
Overweight with a BMI of 27.25 when his BMI was 30 not too long ago.  Patient has been intentionally losing weight with a better diet and exercise.  Will continue to monitor and follow-up.

## 2025-07-25 NOTE — PROGRESS NOTES
Name: Kelton Handy      : 1965      MRN: [id number]  Encounter Provider: Tika Vu MD  Encounter Date: 2025   Encounter department: [facility]  :  Assessment & Plan  Type 2 diabetes mellitus without complication, without long-term current use of insulin ([facility])    Lab Results   Component Value Date    HGBA1C 6.1 (H) 2025   Very much improved.  His glucose went down to 121 now when it was 191 and his A1c went down to 6.1 now when it was 7.3 in April of this year and 6.3 in 2023.  Patient asserts that he has improved his diet and is exercising as well.  And is losing weight intentionally.  Patient has seen ophthalmology and said that he does not have any diabetic retinopathy.  He will continue his current treatment plan.  Will recheck his labs again in 4 months.  If his A1c is still 6.1 or below, to consider cutting down his medication to just 1 pill once per day of metformin  mg.  Patient agrees with the plan and will do.    Orders:  •  Hemoglobin A1C; Future  •  Comprehensive metabolic panel; Future    Mixed hyperlipidemia  Very much improved.  LFTs normal now.  His total cholesterol went down to 83 from 119 from April of this year, triglycerides went up to 87 from 215, his HDL remained the same at 33 and his LDL went to 33 from 43.  Discussed with patient.  Patient advised to continue improving his diet.  Will recheck his FLP in 6 months.  Orders:  •  Comprehensive metabolic panel; Future    Overweight with body mass index (BMI) of 27 to 27.9 in adult    Overweight with a BMI of 27.25 when his BMI was 30 not too long ago.  Patient has been intentionally losing weight with a better diet and exercise.  Will continue to monitor and follow-up.       Screening for colon cancer  Discussed with patient.  Orders:  •  Ambulatory Referral to Gastroenterology; Future    Need for vaccination  Discussed with patient.  Orders:  •  Zoster Vac Recomb  Adjuvanted (Shingrix) 50 MCG/0.5ML SUSR; Inject 0.5 mL into a muscle once for 1 dose Repeat dose in 2 to 6 months        RTO 4 months for follow-up on his diabetes.  Do the labs 1 week before.  Patient can see me prior to that for anything else in between.           History of Present Illness   59-year-old male here for an evaluation of his diabetes and hyperlipidemia.  Patient did labs recently.  Patient asserts he saw the eye doctor for his diabetes and he was told he has no diabetic retinopathy.  Patient denies tobacco.  Patient is losing weight with a better diet and exercise.  Patient still has not gone to GI for his colonoscopy.  And he is due for his Shingrix vaccine.  No history of an adverse reaction to vaccines in the past.      Review of Systems   Constitutional:  Negative for chills, fatigue, fever and unexpected weight change.   HENT:  Negative for congestion and sore throat.    Eyes:  Negative for visual disturbance.   Respiratory:  Negative for cough and shortness of breath.    Cardiovascular:  Negative for chest pain and palpitations.   Gastrointestinal:  Negative for abdominal pain and blood in stool.   Genitourinary:  Negative for dysuria and hematuria.   Neurological:  Negative for dizziness and headaches.   Psychiatric/Behavioral:  Negative for dysphoric mood. The patient is not nervous/anxious.        Objective   /69 (BP Location: Left arm, Patient Position: Sitting, Cuff Size: Large)   Pulse 72   Temp 97.6 °F (36.4 °C) (Temporal)   Resp 16   Ht 6' (1.829 m)   Wt 90.8 kg (200 lb 3.2 oz)   SpO2 100%   BMI 27.15 kg/m²      Physical Exam  Vitals reviewed.   Constitutional:       General: He is not in acute distress.     Appearance: Normal appearance. He is not ill-appearing.   HENT:      Mouth/Throat:      Mouth: Mucous membranes are moist.   Neck:      Vascular: No carotid bruit.     Cardiovascular:      Rate and Rhythm: Normal rate and regular rhythm.   Pulmonary:      Effort:  Pulmonary effort is normal.      Breath sounds: Normal breath sounds.     Musculoskeletal:      Right lower leg: No edema.      Left lower leg: No edema.     Neurological:      General: No focal deficit present.      Mental Status: He is alert and oriented to person, place, and time.     Psychiatric:         Mood and Affect: Mood normal.         Behavior: Behavior normal.         Thought Content: Thought content normal.

## 2025-07-25 NOTE — ASSESSMENT & PLAN NOTE
Very much improved.  LFTs normal now.  His total cholesterol went down to 83 from 119 from April of this year, triglycerides went up to 87 from 215, his HDL remained the same at 33 and his LDL went to 33 from 43.  Discussed with patient.  Patient advised to continue improving his diet.  Will recheck his FLP in 6 months.  Orders:  •  Comprehensive metabolic panel; Future

## (undated) DEVICE — PLUMEPEN PRO 10FT

## (undated) DEVICE — 3M™ STERI-STRIP™ REINFORCED ADHESIVE SKIN CLOSURES, R1547, 1/2 IN X 4 IN (12 MM X 100 MM), 6 STRIPS/ENVELOPE: Brand: 3M™ STERI-STRIP™

## (undated) DEVICE — CHLORAPREP HI-LITE 26ML ORANGE

## (undated) DEVICE — SUT VICRYL 3-0 SH 27 IN J416H

## (undated) DEVICE — BETHLEHEM UNIVERSAL MINOR GEN: Brand: CARDINAL HEALTH

## (undated) DEVICE — INTENDED FOR TISSUE SEPARATION, AND OTHER PROCEDURES THAT REQUIRE A SHARP SURGICAL BLADE TO PUNCTURE OR CUT.: Brand: BARD-PARKER SAFETY BLADES SIZE 15, STERILE

## (undated) DEVICE — GLOVE SRG BIOGEL 5.5

## (undated) DEVICE — SUT MONOCRYL 4-0 PS-2 18 IN Y496G

## (undated) DEVICE — ADHESIVE SKIN HIGH VISCOSITY EXOFIN 1ML

## (undated) DEVICE — PLASTIC ADHESIVE BANDAGE: Brand: CURITY

## (undated) DEVICE — 3M™ STERI-STRIP™ COMPOUND BENZOIN TINCTURE 40 BAGS/CARTON 4 CARTONS/CASE C1544: Brand: 3M™ STERI-STRIP™